# Patient Record
Sex: MALE | Race: BLACK OR AFRICAN AMERICAN | Employment: OTHER | URBAN - METROPOLITAN AREA
[De-identification: names, ages, dates, MRNs, and addresses within clinical notes are randomized per-mention and may not be internally consistent; named-entity substitution may affect disease eponyms.]

---

## 2020-05-28 ENCOUNTER — APPOINTMENT (OUTPATIENT)
Dept: GENERAL RADIOLOGY | Facility: HOSPITAL | Age: 35
DRG: 493 | End: 2020-05-28
Attending: EMERGENCY MEDICINE
Payer: MEDICARE

## 2020-05-28 ENCOUNTER — HOSPITAL ENCOUNTER (INPATIENT)
Facility: HOSPITAL | Age: 35
LOS: 7 days | Discharge: HOME HEALTH | DRG: 493 | End: 2020-06-06
Attending: EMERGENCY MEDICINE | Admitting: INTERNAL MEDICINE
Payer: MEDICARE

## 2020-05-28 DIAGNOSIS — S82.891A CLOSED FRACTURE OF RIGHT ANKLE, INITIAL ENCOUNTER: Primary | ICD-10-CM

## 2020-05-28 PROCEDURE — 73610 X-RAY EXAM OF ANKLE: CPT | Performed by: EMERGENCY MEDICINE

## 2020-05-28 PROCEDURE — 99285 EMERGENCY DEPT VISIT HI MDM: CPT

## 2020-05-28 PROCEDURE — 27840 TREAT ANKLE DISLOCATION: CPT

## 2020-05-28 PROCEDURE — 36415 COLL VENOUS BLD VENIPUNCTURE: CPT

## 2020-05-28 RX ORDER — GLIPIZIDE 10 MG/1
10 TABLET ORAL
COMMUNITY

## 2020-05-28 RX ORDER — ISOSORBIDE DINITRATE 20 MG/1
20 TABLET ORAL 2 TIMES DAILY
Status: ON HOLD | COMMUNITY
End: 2020-05-29

## 2020-05-28 RX ORDER — HYDROCODONE BITARTRATE AND ACETAMINOPHEN 5; 325 MG/1; MG/1
2 TABLET ORAL ONCE
Status: COMPLETED | OUTPATIENT
Start: 2020-05-28 | End: 2020-05-28

## 2020-05-29 ENCOUNTER — APPOINTMENT (OUTPATIENT)
Dept: GENERAL RADIOLOGY | Facility: HOSPITAL | Age: 35
DRG: 493 | End: 2020-05-29
Attending: EMERGENCY MEDICINE
Payer: MEDICARE

## 2020-05-29 PROBLEM — S82.891A CLOSED RIGHT ANKLE FRACTURE, INITIAL ENCOUNTER: Status: ACTIVE | Noted: 2020-05-29

## 2020-05-29 PROBLEM — S82.891A CLOSED FRACTURE OF RIGHT ANKLE, INITIAL ENCOUNTER: Status: ACTIVE | Noted: 2020-05-29

## 2020-05-29 PROBLEM — S82.892A ANKLE FRACTURE, LEFT: Status: ACTIVE | Noted: 2020-05-29

## 2020-05-29 PROCEDURE — 85610 PROTHROMBIN TIME: CPT | Performed by: EMERGENCY MEDICINE

## 2020-05-29 PROCEDURE — 97530 THERAPEUTIC ACTIVITIES: CPT

## 2020-05-29 PROCEDURE — 83036 HEMOGLOBIN GLYCOSYLATED A1C: CPT | Performed by: INTERNAL MEDICINE

## 2020-05-29 PROCEDURE — 84132 ASSAY OF SERUM POTASSIUM: CPT | Performed by: INTERNAL MEDICINE

## 2020-05-29 PROCEDURE — 94660 CPAP INITIATION&MGMT: CPT

## 2020-05-29 PROCEDURE — 73610 X-RAY EXAM OF ANKLE: CPT | Performed by: EMERGENCY MEDICINE

## 2020-05-29 PROCEDURE — 85025 COMPLETE CBC W/AUTO DIFF WBC: CPT | Performed by: EMERGENCY MEDICINE

## 2020-05-29 PROCEDURE — 80048 BASIC METABOLIC PNL TOTAL CA: CPT | Performed by: EMERGENCY MEDICINE

## 2020-05-29 PROCEDURE — 83735 ASSAY OF MAGNESIUM: CPT | Performed by: INTERNAL MEDICINE

## 2020-05-29 PROCEDURE — 97166 OT EVAL MOD COMPLEX 45 MIN: CPT

## 2020-05-29 PROCEDURE — 85025 COMPLETE CBC W/AUTO DIFF WBC: CPT | Performed by: INTERNAL MEDICINE

## 2020-05-29 PROCEDURE — 97162 PT EVAL MOD COMPLEX 30 MIN: CPT

## 2020-05-29 PROCEDURE — 87641 MR-STAPH DNA AMP PROBE: CPT | Performed by: INTERNAL MEDICINE

## 2020-05-29 PROCEDURE — 85730 THROMBOPLASTIN TIME PARTIAL: CPT | Performed by: EMERGENCY MEDICINE

## 2020-05-29 PROCEDURE — 80048 BASIC METABOLIC PNL TOTAL CA: CPT | Performed by: INTERNAL MEDICINE

## 2020-05-29 PROCEDURE — 82962 GLUCOSE BLOOD TEST: CPT

## 2020-05-29 RX ORDER — AMLODIPINE BESYLATE 10 MG/1
10 TABLET ORAL DAILY
Status: DISCONTINUED | OUTPATIENT
Start: 2020-05-29 | End: 2020-06-06

## 2020-05-29 RX ORDER — HYDROCODONE BITARTRATE AND ACETAMINOPHEN 10; 325 MG/1; MG/1
1 TABLET ORAL EVERY 4 HOURS PRN
Status: DISCONTINUED | OUTPATIENT
Start: 2020-05-29 | End: 2020-05-29

## 2020-05-29 RX ORDER — DULOXETIN HYDROCHLORIDE 60 MG/1
60 CAPSULE, DELAYED RELEASE ORAL 2 TIMES DAILY
COMMUNITY

## 2020-05-29 RX ORDER — AMITRIPTYLINE HYDROCHLORIDE 25 MG/1
25 TABLET, FILM COATED ORAL NIGHTLY
Status: DISCONTINUED | OUTPATIENT
Start: 2020-05-29 | End: 2020-06-06

## 2020-05-29 RX ORDER — FOLIC ACID 1 MG/1
TABLET ORAL DAILY
COMMUNITY

## 2020-05-29 RX ORDER — HYDROCODONE BITARTRATE AND ACETAMINOPHEN 5; 325 MG/1; MG/1
1 TABLET ORAL EVERY 6 HOURS PRN
Status: DISCONTINUED | OUTPATIENT
Start: 2020-05-29 | End: 2020-06-03 | Stop reason: ALTCHOICE

## 2020-05-29 RX ORDER — DEXTROSE MONOHYDRATE 25 G/50ML
50 INJECTION, SOLUTION INTRAVENOUS
Status: DISCONTINUED | OUTPATIENT
Start: 2020-05-29 | End: 2020-06-06

## 2020-05-29 RX ORDER — ISOSORBIDE MONONITRATE 60 MG/1
60 TABLET, EXTENDED RELEASE ORAL DAILY
Status: DISCONTINUED | OUTPATIENT
Start: 2020-05-29 | End: 2020-06-02

## 2020-05-29 RX ORDER — MELATONIN
100 DAILY
Status: DISCONTINUED | OUTPATIENT
Start: 2020-05-29 | End: 2020-06-06

## 2020-05-29 RX ORDER — ATORVASTATIN CALCIUM 80 MG/1
80 TABLET, FILM COATED ORAL DAILY
COMMUNITY

## 2020-05-29 RX ORDER — POTASSIUM CHLORIDE 20 MEQ/1
40 TABLET, EXTENDED RELEASE ORAL EVERY 4 HOURS
Status: COMPLETED | OUTPATIENT
Start: 2020-05-29 | End: 2020-05-29

## 2020-05-29 RX ORDER — AMITRIPTYLINE HYDROCHLORIDE 25 MG/1
25 TABLET, FILM COATED ORAL NIGHTLY
COMMUNITY

## 2020-05-29 RX ORDER — CLOPIDOGREL BISULFATE 75 MG/1
75 TABLET ORAL DAILY
Status: ON HOLD | COMMUNITY
End: 2020-05-30

## 2020-05-29 RX ORDER — METOPROLOL TARTRATE 100 MG/1
100 TABLET ORAL 2 TIMES DAILY
Status: DISCONTINUED | OUTPATIENT
Start: 2020-05-29 | End: 2020-06-06

## 2020-05-29 RX ORDER — ISOSORBIDE MONONITRATE 60 MG/1
60 TABLET, EXTENDED RELEASE ORAL DAILY
COMMUNITY
End: 2020-11-11 | Stop reason: DRUGHIGH

## 2020-05-29 RX ORDER — MORPHINE SULFATE 2 MG/ML
2 INJECTION, SOLUTION INTRAMUSCULAR; INTRAVENOUS EVERY 2 HOUR PRN
Status: DISCONTINUED | OUTPATIENT
Start: 2020-05-29 | End: 2020-06-03 | Stop reason: ALTCHOICE

## 2020-05-29 RX ORDER — METOPROLOL TARTRATE 100 MG/1
100 TABLET ORAL 2 TIMES DAILY
COMMUNITY

## 2020-05-29 RX ORDER — LOSARTAN POTASSIUM AND HYDROCHLOROTHIAZIDE 25; 100 MG/1; MG/1
1 TABLET ORAL DAILY
COMMUNITY

## 2020-05-29 RX ORDER — AMLODIPINE BESYLATE 10 MG/1
10 TABLET ORAL DAILY
COMMUNITY

## 2020-05-29 RX ORDER — MORPHINE SULFATE 2 MG/ML
1 INJECTION, SOLUTION INTRAMUSCULAR; INTRAVENOUS EVERY 2 HOUR PRN
Status: DISCONTINUED | OUTPATIENT
Start: 2020-05-29 | End: 2020-06-03 | Stop reason: ALTCHOICE

## 2020-05-29 RX ORDER — ACETAMINOPHEN 325 MG/1
650 TABLET ORAL EVERY 6 HOURS PRN
Status: DISCONTINUED | OUTPATIENT
Start: 2020-05-29 | End: 2020-06-06

## 2020-05-29 RX ORDER — ATORVASTATIN CALCIUM 40 MG/1
80 TABLET, FILM COATED ORAL DAILY
Status: DISCONTINUED | OUTPATIENT
Start: 2020-05-29 | End: 2020-06-06

## 2020-05-29 RX ORDER — MULTIVITAMIN
1 TABLET ORAL DAILY
COMMUNITY

## 2020-05-29 RX ORDER — DULOXETIN HYDROCHLORIDE 60 MG/1
60 CAPSULE, DELAYED RELEASE ORAL 2 TIMES DAILY
Status: DISCONTINUED | OUTPATIENT
Start: 2020-05-29 | End: 2020-06-06

## 2020-05-29 RX ORDER — FOLIC ACID 1 MG/1
1 TABLET ORAL DAILY
Status: DISCONTINUED | OUTPATIENT
Start: 2020-05-29 | End: 2020-06-06

## 2020-05-29 RX ORDER — DOXEPIN HYDROCHLORIDE 50 MG/1
1 CAPSULE ORAL DAILY
Status: DISCONTINUED | OUTPATIENT
Start: 2020-05-29 | End: 2020-06-06

## 2020-05-29 RX ORDER — THIAMINE MONONITRATE (VIT B1) 100 MG
100 TABLET ORAL DAILY
COMMUNITY

## 2020-05-29 RX ORDER — SODIUM CHLORIDE 0.9 % (FLUSH) 0.9 %
3 SYRINGE (ML) INJECTION AS NEEDED
Status: DISCONTINUED | OUTPATIENT
Start: 2020-05-29 | End: 2020-06-06

## 2020-05-29 RX ORDER — MORPHINE SULFATE 4 MG/ML
4 INJECTION, SOLUTION INTRAMUSCULAR; INTRAVENOUS EVERY 2 HOUR PRN
Status: DISCONTINUED | OUTPATIENT
Start: 2020-05-29 | End: 2020-06-03 | Stop reason: ALTCHOICE

## 2020-05-29 RX ORDER — HYDROCODONE BITARTRATE AND ACETAMINOPHEN 5; 325 MG/1; MG/1
2 TABLET ORAL EVERY 6 HOURS PRN
Status: DISCONTINUED | OUTPATIENT
Start: 2020-05-29 | End: 2020-06-03 | Stop reason: ALTCHOICE

## 2020-05-29 NOTE — H&P
[address] Patient Status:  Inpatient    10/30/1985 MRN [id number]   Location [facility] 4W/SW/SE Attending Nivia Hargrove DO   Hosp Day # 0 PCP No primary care provider on file.      D mouth daily. folic acid 1 MG Oral Tab, Take by mouth daily. Multiple Vitamin (ONE-DAILY MULTI VITAMINS) Oral Tab, Take 1 tablet by mouth daily. Vitamin B-1 100 MG Oral Tab, Take 100 mg by mouth daily.   metFORMIN HCl 1000 MG Oral Tab, Take 1,000 mg by mo 05/29/2020    MG 1.9 05/29/2020       Xr Ankle (min 3 Views), Right (cpt=73610)    Result Date: 5/29/2020  CONCLUSION:  1. There has been interval reduction of the previously demonstrated lateral ankle dislocation, now with anatomic alignment.   2. Minimal [address] 61, DO  5/29/2020

## 2020-05-29 NOTE — ED PROVIDER NOTES
Patient Seen in: [facility] AND [facility] Emergency Department      History   Patient presents with:   Ankle Injury    Stated Complaint: Ankle injury    HPI    76-year-old male with history of diabetes, hypertension, stroke with residual left-sided weakness pres Pulmonary effort is normal. No respiratory distress. Musculoskeletal: Normal range of motion. General: Deformity present. Comments: Deformity, swelling, tenderness to right ankle   Skin:     General: Skin is warm.       Capillary Refill: Capi interpretation of the above emergency department workup, the patient was found to have Closed fracture of right ankle, initial encounter  (primary encounter diagnosis)    Plan: Patient with fracture, improved anatomical alignment after reduction.   Due to r

## 2020-05-29 NOTE — ED INITIAL ASSESSMENT (HPI)
Patient presents to ED with c/o of right ankle pain after \"his leg gave out\" and patient stated he rolled his ankle and \"heard a pop\". Denies dizziness, denies head injury, denies loc.

## 2020-05-29 NOTE — CM/SW NOTE
ELVIS spoke with RN during morning report. RN states pt would not anticipate DC for a couple of days due to being on plavix. ELVIS received message from April OT who states the recommendation is for Home with [place] [id number] vs Acute Rehab.  However therapy plans on rowdy

## 2020-05-29 NOTE — CM/SW NOTE
COND 44: Patient failed Inpatient criteria. Second level of review completed and supports Observation. UR committee in agreement. Discussed with Dr Patrick Saul approves.

## 2020-05-29 NOTE — PLAN OF CARE
Pat Farooq is from home with spouse. S/p fall with a right ankle fx. Hx cva and has been on plavix. Surgery in 5-7 days due to this medication. Short leg post mold with ace wrap-cms intact.  1800 ada diet-ac/hs glucose monitoring/insulin as indicated, voiding for bleeding, hypotension and signs of decreased cardiac output  - Evaluate effectiveness of vasoactive medications to optimize hemodynamic stability  - Monitor arterial and/or venous puncture sites for bleeding and/or hematoma  - Assess quality of pulses, bladder  - Monitor intake/output and perform bladder scan as needed  - Follow urinary retention protocol/standard of care  - Consider collaborating with pharmacy to review patient's medication profile  - Implement strategies to promote bladder emptying  Hernando Dodge

## 2020-05-29 NOTE — OCCUPATIONAL THERAPY NOTE
OCCUPATIONAL THERAPY EVALUATION - INPATIENT      Room Number: 390/367-D  Evaluation Date: 5/29/2020  Type of Evaluation: Initial  Presenting Problem: (right ankle fracture)    Physician Order: IP Consult to Occupational Therapy  Reason for Therapy: AD[facility]. He may benefit from Acute Rehab stay if he is unable to safely d/c home. The patient's Approx Degree of Impairment: 50.11% has been calculated based on documentation in the [facility] '6 clicks' Inpatient Daily Activity Short Form.   Research supports COGNITION  Overall Cognitive Status:  WFL - within functional limits    RANGE OF MOTION   Upper extremity ROM is within functional limits     STRENGTH ASSESSMENT  Upper extremity strength is within functional limits         [facility]ILY LIVING [facility] [id number] [address]  [id number]

## 2020-05-29 NOTE — PLAN OF CARE
PT ADMITTED TODAY FOR RIGHT ANKLE FRACTURE AT HOME AFTER FALLING, ALERT AND ORIENTED HE IS NPO AFTER MIDNIGHT FOR POSSIBLE SURGERY AFTER ORTHO (CHARY) AND BAUBLY COME TO EVALUATE IN AM. BED REST MAINTAINED. SCDSX2.  TAKES PLAVIX  [address] SURGICAL CONSULT, PT/OT  - See additional Care Plan goals for specific interventions  5/29/2020 0541 by Anjum Ramírez, RN  Outcome: Progressing  5/29/2020 0452 by Anjum Ramírez RN  Outcome: Progressing  Goal: Patient/Family Short Term Goal  Descript PO as ordered and tolerated  - Nasogastric tube to low intermittent suction as ordered  - Evaluate effectiveness of ordered antiemetic medications  - Provide nonpharmacologic comfort measures as appropriate  - Advance diet as tolerated, if ordered  - Obtai

## 2020-05-29 NOTE — PHYSICAL THERAPY NOTE
PHYSICAL THERAPY EVALUATION - INPATIENT     Room Number: 789/340-F  Evaluation Date: 5/29/2020  Type of Evaluation: Initial   Physician Order: PT Eval and Treat(JACQUES TAYLOR)    Presenting Problem: fall on stairs, R ankle fx  Reason for Therapy: Mobility Dysfu ankle fx immobilized with NWB status, decreased activity tolerance, balance deficits, which are below the patient's pre-admission status.  The patient's Approx Degree of Impairment: 61.29% has been calculated based on documentation in the [facility] '6 clicks' I step and fell on my ankle. \"    PHYSICAL THERAPY EXAMINATION     OBJECTIVE  Precautions: Limb alert - right;Other (Comment)(hx CVA with L estela-paresis)  Fall Risk: High fall risk    WEIGHT BEARING RESTRICTION  Weight Bearing Restriction: R lower extremity Assistance: Not tested  Comment : assist for walker management d/t instability    Bed Mobility: supine to sit tx at SBA    Transfers: sit to stand tx with bariatric rolling walker at MOD A, verbal cues for hand placement and RLE positioning for NWB    Exer

## 2020-05-29 NOTE — PROGRESS NOTES
Patient seen and examined. Chart x-rays reviewed.   Full consult dictated:    54-year-old male with a history of diabetes, hypertension, status post CVA with residual left-sided hemiparesis/hemiplegia on Plavix who missed stepped and sustained a bimalleola

## 2020-05-29 NOTE — CONSULTS
[facility]    PATIENT'S NAME: Pedro Pablo Holley   ATTENDING PHYSICIAN: Bridget Morales DO   CONSULTING PHYSICIAN: Yousif Torres MD   PATIENT ACCOUNT#:   [de-identified]    LOCATION:  [address] [place] [address] #:   [id number]       DATE OF problems because of anesthesia.     MEDICATIONS:  He is on Plavix; duloxetine; losartan; atorvastatin; insulin glargine; isosorbide mononitrate ER; metoprolol; amitriptyline HCL; amlodipine besylate; folic acid; multivitamins; vitamin B1; metformin; glipizi posttraumatic arthritic symptoms of residual pain, stiffness, soreness, and swelling, even with good position and alignment; significant increased risk for skin blister formation and the subsequent need of external fixation or plastic surgical consultation

## 2020-05-30 PROCEDURE — 97530 THERAPEUTIC ACTIVITIES: CPT

## 2020-05-30 PROCEDURE — 82962 GLUCOSE BLOOD TEST: CPT

## 2020-05-30 RX ORDER — HYDROCODONE BITARTRATE AND ACETAMINOPHEN 5; 325 MG/1; MG/1
2 TABLET ORAL EVERY 6 HOURS PRN
Qty: 30 TABLET | Refills: 0 | Status: SHIPPED | OUTPATIENT
Start: 2020-05-30 | End: 2020-06-06

## 2020-05-30 NOTE — PHYSICAL THERAPY NOTE
PHYSICAL THERAPY TREATMENT NOTE - INPATIENT     Room Number: 825/132-S       Presenting Problem: fall on stairs, R ankle fx    Problem List  Principal Problem:    Closed fracture of right ankle, initial encounter  Active Problems:    Closed right ankle fra promotion; Body mechanics; Relaxation;Repositioning    BALANCE                                                                                                                     Static Sitting: Good  Dynamic Sitting: Fair +           Static Standing: Poor + x2   Goal #3 Patient is able to ambulate 10 feet with assist device:bariatric walker - rolling at assistance level: supervision   Goal #3   Current Status Min A x2 with the RW SPT   Goal #4 Patient will negotiate 5 stairs/one curb w/ assistive device and M

## 2020-05-30 NOTE — PROGRESS NOTES
[place] [facility]    Progress Note    Vaenssa Schwartz Patient Status:  Observation    10/30/1985 MRN [id number]   Location [facility] 4W/SW/SE Attending Rosita Fung, [address] [facility]note 2[facility] Day # 1 PCP No primary care provider on file.      Date 2% nasal ointment OINT 1 Application, 1 Application, Each Nare, BID  apixaban (ELIQUIS) tab 2.5 mg, 2.5 mg, Oral, BID  [COMPLETED] Potassium Chloride ER (K-DUR M20) CR tab 40 mEq, 40 mEq, Oral, Q4H  Amitriptyline HCl (ELAVIL) tab 25 mg, 25 mg, Oral, Nightl 97%   BMI 48.80 kg/m²   Physical examination is unchanged. Presently no increased swelling right ankle/foot.   Laboratory Data:  Lab Results   Component Value Date    WBC 7.7 05/29/2020    HGB 13.3 05/29/2020    HCT 39.0 05/29/2020    .0 05/29/2020 performed in the emergency room with considerable improvement in the alignment of the fibular fracture as well.     As this represents an unstable injury with not only a fracture but dislocation and soft tissue injury in a very large, young active individu ordered for DVT prophylaxis.       Again numerous questions were asked and answered to the best of my abilities; therefore, I do feel that the patient has a good understanding of the above discussion.               Digital transcription software was utilize

## 2020-05-30 NOTE — PROGRESS NOTES
[address] Patient Status:  Inpatient    10/30/1985 MRN [id number]   Location [facility] 4W/SW/SE Attending Juan Carlos Mccormack, 160[address] [facility] Day # 1 PCP No primary care provider on file. patient has notable tremor and ataxia  Musculoskeletal: Right ankle is splinted, and patient has normal movement of toes on the right  Integument: Right ankle is splinted and bandaged  Psychiatric: Appropriate mood and affect.         Results:     Lab Resul ankle, initial encounter  -Reduced in ED, however patient will require ORIF  -Surgery planned for 6/3, giving time for Plavix clearance  -Anticoagulate with Eliquis in the meantime  -Appreciate orthopedic surgery recommendations  -PT/OT recommend subacute

## 2020-05-30 NOTE — PLAN OF CARE
Problem: Patient Centered Care  Goal: Patient preferences are identified and integrated in the patient's plan of care  Description  Interventions:  - What would you like us to know as we care for you?  Supportive family  - Provide timely, complete, and ac bleeding and/or hematoma  - Assess quality of pulses, skin color and temperature  - Assess for signs of decreased coronary artery perfusion - ex.  Angina  - Evaluate fluid balance, assess for edema, trend weights  Outcome: Progressing  Goal: Absence of card Functional Mobility  Goal: Achieve highest/safest level of mobility/gait  Description  Interventions:  - Assess patient's functional ability and stability  - Promote increasing activity/tolerance for mobility and gait  - Educate and engage patient/family i

## 2020-05-30 NOTE — OCCUPATIONAL THERAPY NOTE
OCCUPATIONAL THERAPY TREATMENT NOTE - INPATIENT    Room Number: 477/123-L         Presenting Problem: (right ankle fracture)     Problem List  Principal Problem:    Closed fracture of right ankle, initial encounter  Active Problems:    Closed right ankle f Inpatient Daily Activity Short Form  How much help from another person does the patient currently need…  -   Putting on and taking off regular lower body clothing?: A Lot  -   Bathing (including washing, rinsing, drying)?: A Lot  -   Toileting, which inclu

## 2020-05-31 PROBLEM — S82.841A ANKLE FRACTURE, BIMALLEOLAR, CLOSED, RIGHT, INITIAL ENCOUNTER: Status: ACTIVE | Noted: 2020-05-31

## 2020-05-31 PROCEDURE — 82962 GLUCOSE BLOOD TEST: CPT

## 2020-05-31 PROCEDURE — 97530 THERAPEUTIC ACTIVITIES: CPT

## 2020-05-31 RX ORDER — BISACODYL 10 MG
10 SUPPOSITORY, RECTAL RECTAL
Status: DISCONTINUED | OUTPATIENT
Start: 2020-05-31 | End: 2020-06-03 | Stop reason: ALTCHOICE

## 2020-05-31 RX ORDER — SODIUM PHOSPHATE, DIBASIC AND SODIUM PHOSPHATE, MONOBASIC 7; 19 G/133ML; G/133ML
1 ENEMA RECTAL ONCE AS NEEDED
Status: DISCONTINUED | OUTPATIENT
Start: 2020-05-31 | End: 2020-06-03

## 2020-05-31 RX ORDER — POLYETHYLENE GLYCOL 3350 17 G/17G
17 POWDER, FOR SOLUTION ORAL DAILY PRN
Status: DISCONTINUED | OUTPATIENT
Start: 2020-05-31 | End: 2020-06-03

## 2020-05-31 RX ORDER — HYDRALAZINE HYDROCHLORIDE 25 MG/1
25 TABLET, FILM COATED ORAL EVERY 8 HOURS SCHEDULED
Status: DISCONTINUED | OUTPATIENT
Start: 2020-05-31 | End: 2020-06-01

## 2020-05-31 RX ORDER — DOCUSATE SODIUM 100 MG/1
100 CAPSULE, LIQUID FILLED ORAL 2 TIMES DAILY
Status: DISCONTINUED | OUTPATIENT
Start: 2020-05-31 | End: 2020-06-03

## 2020-05-31 RX ORDER — INSULIN ASPART 100 [IU]/ML
3 INJECTION, SOLUTION INTRAVENOUS; SUBCUTANEOUS
Status: DISCONTINUED | OUTPATIENT
Start: 2020-05-31 | End: 2020-05-31

## 2020-05-31 NOTE — PROGRESS NOTES
[address] Patient Status:  Inpatient    10/30/1985 MRN [id number]   Location [facility] 4W/SW/SE Attending Angelina Stockton, [address] [facility] Day # 1 PCP No primary care provider on file. bandaged  Psychiatric: Appropriate mood and affect.         Results:     Lab Results   Component Value Date    WBC 7.7 05/29/2020    HGB 13.3 05/29/2020    HCT 39.0 05/29/2020    .0 05/29/2020    CREATSERUM 1.06 05/29/2020    BUN 15 05/29/2020    NA

## 2020-05-31 NOTE — PROGRESS NOTES
[place] [facility] - [facility]    Progress Note    Paul Ramsay Patient Status:  Observation    10/30/1985 MRN [id number]   Location [facility] 4W/SW/SE Attending Nory Frausto, [address] [facility] Day # 1 PCP No primary care provider on file.      Date flexpen 10 Units, 10 Units, Subcutaneous, Once  Normal Saline Flush 0.9 % injection 3 mL, 3 mL, Intravenous, PRN  glucose (DEX4) oral liquid 15 g, 15 g, Oral, Q15 Min PRN    Or  Glucose-Vitamin C (DEX-4) chewable tab 4 tablet, 4 tablet, Oral, Q15 Min PRN • Stroke [facility])       Surgical History:  History reviewed. No pertinent surgical history. Social History:  Social History    Tobacco Use      Smoking status: Never Smoker      Smokeless tobacco: Never Used    Alcohol use: Yes      Alcohol/week: 5.0 - 6. strict sterile techniques and prophylactic antibiotics; increased risk for malunion, nonunion, delayed union given the significant extent of the injury; the increased risk of hardware-related problems given the comminuted fracture; posttraumatic arthritic

## 2020-05-31 NOTE — PROGRESS NOTES
Took over care at 1500. Pt OOB to the chair. Denies pain. Post mold and ace wrap to RLE. CMS intact. Offered ice pack and discussed importance of RLE elevation to prevent swelling. Pt verbalized understanding of safety measures. Call light within reach.  Wi

## 2020-05-31 NOTE — PLAN OF CARE
Problem: Patient Centered Care  Goal: Patient preferences are identified and integrated in the patient's plan of care  Description  Interventions:  - What would you like us to know as we care for you?  Supportive family  - Provide timely, complete, and ac decreased coronary artery perfusion - ex.  Angina  - Evaluate fluid balance, assess for edema, trend weights  Outcome: Progressing  Goal: Absence of cardiac arrhythmias or at baseline  Description  INTERVENTIONS:  - Continuous cardiac monitoring, monitor vi Assess patient's functional ability and stability  - Promote increasing activity/tolerance for mobility and gait  - Educate and engage patient/family in tolerated activity level and precautions    Outcome: Progressing   RLE post mold c/d/I. CMS intact.  Abl

## 2020-05-31 NOTE — PHYSICAL THERAPY NOTE
PHYSICAL THERAPY TREATMENT NOTE - INPATIENT     Room Number: 395/325-B       Presenting Problem: fall on stairs, R ankle fx    Problem List  Principal Problem:    Closed fracture of right ankle, initial encounter  Active Problems:    Closed right ankle fra 0  Location: R ankle  Management Techniques: Activity promotion; Body mechanics; Relaxation;Repositioning    BALANCE                                                                                                                     Static Sitting: Good  Dyn supervision with none     Goal #2  Current Status Mod A    Goal #3 Patient is able to ambulate 10 feet with assist device:bariatric walker - rolling at assistance level: supervision   Goal #3   Current Status Mod>max A with the RW while hopping a few steps

## 2020-05-31 NOTE — CONSULTS
Consult requested on this 27year old man with PMH of HTN, morbid obesity with BMI 48, diabeted, and history of CVA with left sided hemiparesis, tremor and ataxia.  Presented to ED on 5/28 for right ankle pain after mechanical fall, diagnosed with right bim

## 2020-06-01 PROCEDURE — 97110 THERAPEUTIC EXERCISES: CPT

## 2020-06-01 PROCEDURE — 82962 GLUCOSE BLOOD TEST: CPT

## 2020-06-01 RX ORDER — HYDRALAZINE HYDROCHLORIDE 100 MG/1
100 TABLET, FILM COATED ORAL EVERY 8 HOURS SCHEDULED
Status: DISCONTINUED | OUTPATIENT
Start: 2020-06-01 | End: 2020-06-06

## 2020-06-01 NOTE — PROGRESS NOTES
[place] [facility] - [facility]    Progress note    Mitzi Mccann Patient Status:  Inpatient    10/30/1985 MRN [id number]   Location [facility] 4W/SW/SE Attending Tanvi Mancini, [address] [facility] Day # 1 PCP No primary care provider on file.        Subj and bandaged  Psychiatric: Appropriate mood and affect.         Results:     Lab Results   Component Value Date    WBC 7.7 05/29/2020    HGB 13.3 05/29/2020    HCT 39.0 05/29/2020    .0 05/29/2020    CREATSERUM 1.06 05/29/2020    BUN 15 05/29/2020

## 2020-06-01 NOTE — PLAN OF CARE
Problem: Diabetes/Glucose Control  Goal: Glucose maintained within prescribed range  Description  INTERVENTIONS:  - Monitor Blood Glucose as ordered  - Assess for signs and symptoms of hyperglycemia and hypoglycemia  - Administer ordered medications to m Evaluate fluid balance  Outcome: Progressing     Problem: GENITOURINARY - ADULT  Goal: Absence of urinary retention  Description  INTERVENTIONS:  - Assess patient’s ability to void and empty bladder  - Monitor intake/output and perform bladder scan as need

## 2020-06-01 NOTE — PROGRESS NOTES
[place] [facility] - [facility]    Progress Note    Henny Madden Patient Status:  Observation    10/30/1985 MRN [id number]   Location [facility] 4W/SW/SE Attending Jozef Caal, [address] [facility] Day # 1 PCP No primary care provider on file.      Date Aspart Pen (NOVOLOG) 100 UNIT/ML flexpen 1-11 Units, 1-11 Units, Subcutaneous, TID CC and HS  [COMPLETED] Insulin Aspart Pen (NOVOLOG) 100 UNIT/ML flexpen 10 Units, 10 Units, Subcutaneous, Once  Normal Saline Flush 0.9 % injection 3 mL, 3 mL, Intravenous, tablet, Oral, Once        HISTORY:  Past Medical History:  Past Medical History:   Diagnosis Date   • Diabetes ([facility] [address]    • Essential hypertension    • Stroke [facility])       Surgical History:  History reviewed. No pertinent surgical history.    Social History: strict sterile techniques and prophylactic antibiotics; increased risk for malunion, nonunion, delayed union given the significant extent of the injury; the increased risk of hardware-related problems given the comminuted fracture; posttraumatic arthritic

## 2020-06-01 NOTE — PHYSICAL THERAPY NOTE
PHYSICAL THERAPY TREATMENT NOTE - INPATIENT     Room Number: 443/040-K       Presenting Problem: fall on stairs, R ankle fx    Problem List  Principal Problem:    Closed fracture of right ankle, initial encounter  Active Problems:    Closed right ankle fra Static Sitting: Good  Dynamic Sitting: Fair +           Static Standing: Not tested  Dynamic Standing: Not tested    ACTIVITY TOLERANCE                         O2 WALK                  AM-PAC '6-Clicks' INPATIENT KAREN feet with assist device:bariatric walker - rolling at assistance level: supervision   Goal #3   Current Status NT   Goal #4 Patient will negotiate 5 stairs/one curb w/ assistive device and MIN A   Goal #4   Current Status NT   Goal #5 Patient to demonstrat

## 2020-06-02 ENCOUNTER — APPOINTMENT (OUTPATIENT)
Dept: INTERVENTIONAL RADIOLOGY/VASCULAR | Facility: HOSPITAL | Age: 35
DRG: 493 | End: 2020-06-02
Attending: NURSE PRACTITIONER
Payer: MEDICARE

## 2020-06-02 PROCEDURE — 99152 MOD SED SAME PHYS/QHP 5/>YRS: CPT

## 2020-06-02 PROCEDURE — 99153 MOD SED SAME PHYS/QHP EA: CPT

## 2020-06-02 PROCEDURE — 82962 GLUCOSE BLOOD TEST: CPT

## 2020-06-02 PROCEDURE — 93458 L HRT ARTERY/VENTRICLE ANGIO: CPT

## 2020-06-02 PROCEDURE — 85730 THROMBOPLASTIN TIME PARTIAL: CPT | Performed by: INTERNAL MEDICINE

## 2020-06-02 PROCEDURE — 93005 ELECTROCARDIOGRAM TRACING: CPT

## 2020-06-02 PROCEDURE — 97530 THERAPEUTIC ACTIVITIES: CPT

## 2020-06-02 PROCEDURE — 97110 THERAPEUTIC EXERCISES: CPT

## 2020-06-02 PROCEDURE — 85576 BLOOD PLATELET AGGREGATION: CPT | Performed by: ORTHOPAEDIC SURGERY

## 2020-06-02 PROCEDURE — 93010 ELECTROCARDIOGRAM REPORT: CPT | Performed by: INTERNAL MEDICINE

## 2020-06-02 RX ORDER — ASPIRIN 81 MG/1
324 TABLET, CHEWABLE ORAL ONCE
Status: COMPLETED | OUTPATIENT
Start: 2020-06-02 | End: 2020-06-02

## 2020-06-02 RX ORDER — DIPHENHYDRAMINE HYDROCHLORIDE 50 MG/ML
INJECTION INTRAMUSCULAR; INTRAVENOUS
Status: COMPLETED
Start: 2020-06-02 | End: 2020-06-02

## 2020-06-02 RX ORDER — SODIUM CHLORIDE 9 MG/ML
INJECTION, SOLUTION INTRAVENOUS CONTINUOUS
Status: ACTIVE | OUTPATIENT
Start: 2020-06-02 | End: 2020-06-02

## 2020-06-02 RX ORDER — CHLORHEXIDINE GLUCONATE 4 G/100ML
30 SOLUTION TOPICAL
Status: COMPLETED | OUTPATIENT
Start: 2020-06-03 | End: 2020-06-03

## 2020-06-02 RX ORDER — VERAPAMIL HYDROCHLORIDE 2.5 MG/ML
INJECTION, SOLUTION INTRAVENOUS
Status: COMPLETED
Start: 2020-06-02 | End: 2020-06-02

## 2020-06-02 RX ORDER — LIDOCAINE HYDROCHLORIDE 20 MG/ML
INJECTION, SOLUTION EPIDURAL; INFILTRATION; INTRACAUDAL; PERINEURAL
Status: COMPLETED
Start: 2020-06-02 | End: 2020-06-02

## 2020-06-02 RX ORDER — SODIUM CHLORIDE 9 MG/ML
INJECTION, SOLUTION INTRAVENOUS CONTINUOUS
Status: DISCONTINUED | OUTPATIENT
Start: 2020-06-02 | End: 2020-06-03 | Stop reason: ALTCHOICE

## 2020-06-02 RX ORDER — HEPARIN SODIUM 1000 [USP'U]/ML
INJECTION, SOLUTION INTRAVENOUS; SUBCUTANEOUS
Status: COMPLETED
Start: 2020-06-02 | End: 2020-06-02

## 2020-06-02 RX ORDER — SODIUM CHLORIDE 9 MG/ML
INJECTION, SOLUTION INTRAVENOUS CONTINUOUS
Status: DISCONTINUED | OUTPATIENT
Start: 2020-06-03 | End: 2020-06-04

## 2020-06-02 RX ORDER — ISOSORBIDE MONONITRATE 60 MG/1
120 TABLET, EXTENDED RELEASE ORAL DAILY
Status: DISCONTINUED | OUTPATIENT
Start: 2020-06-02 | End: 2020-06-06

## 2020-06-02 RX ORDER — MIDAZOLAM HYDROCHLORIDE 1 MG/ML
INJECTION INTRAMUSCULAR; INTRAVENOUS
Status: COMPLETED
Start: 2020-06-02 | End: 2020-06-02

## 2020-06-02 NOTE — CONSULTS
[address] [facility]  Cardiology Consultation    Karen Christensen Location: [facility] 4W/SW/SE    10/30/1985 MRN [id number]   Consulting Date 2020 CSN [id number]   Consulting Physicia refills and was fearful of making an appointment due to [place]. Cardiac risks include diabetes, hypertension, hypercholesterolemia, positive family history with multiple uncles, he quit smoking about 10 years ago.   He states he drinks about 3 shots of al UNIT/ML flexpen 8 Units, 8 Units, Subcutaneous, TID CC  •  [START ON 6/3/2020] 0.9% NaCl infusion, , Intravenous, Continuous  •  ceFAZolin in NS (ANCEF) 3g/100mL IVPB premix, 3 g, Intravenous, On Call to OR  •  hydrALAzine HCl (APRESOLINE) tab 100 mg, 100 mg for Hyzaar 100/12.5 ([facility] only), , Oral, Daily  •  Metoprolol Tartrate (LOPRESSOR) tab 100 mg, 100 mg, Oral, BID  •  multivitamin (ADULT) tab 1 tablet, 1 tablet, Oral, Daily  •  Thiamine HCl (Vitamin B-1) tab 100 mg, 100 mg, Oral, Daily  •  HYDROcodone-a complications with his multiple risk factors. He has a history of an abnormal echocardiogram and mildly elevated troponins in the past.  Recommend ischemic prior to surgery.   With multiple risk factors and akinesis on echocardiogram, would recommend proce

## 2020-06-02 NOTE — PHYSICAL THERAPY NOTE
PHYSICAL THERAPY TREATMENT NOTE - INPATIENT     Room Number: 958/923-C       Presenting Problem: fall on stairs, R ankle fx    Problem List  Principal Problem:    Closed fracture of right ankle, initial encounter  Active Problems:    Closed right ankle fra Position: Sitting    O2 WALK                  AM-PAC '6-Clicks' INPATIENT SHORT FORM - BASIC MOBILITY  How much difficulty does the patient currently have. ..  -   Turning over in bed (including adjusting bedclothes, sheets and blankets)?: A Little   -   Si 5-6 small side steps bed to chair with RW and min a   Goal #4 Patient will negotiate 5 stairs/one curb w/ assistive device and MIN A   Goal #4   Current Status NT   Goal #5 Patient to demonstrate independence with home activity/exercise instructions provid

## 2020-06-02 NOTE — PROCEDURES
[facility]  [facility] Cardiac Cath Procedure Note  Aric Michelle Patient Status:  Inpatient    10/30/1985 MRN [id number]   Location [facility] Attending Nikos Adams, [address] [facility] Day # 3 PCP No primary care provid of versed and fentanyl was given. Patient was assessed and monitoring of oxygen, heart rate and blood pressure by nurse and myself during the exam 67 minutes.       Aden Escalante MD  06/02/20

## 2020-06-02 NOTE — PLAN OF CARE
Patient is a/ox4. RA. Post-cath, no intervention. R wrist and R groin sites both c/d/I. Bedrest for 2 hours. Then up with max assist x2 with the walker, stand-pivot transfers. Continent. Accucheck ACHS. IVF @ 100 ml/hr. Plan is for ankle surgery tomorrow. Progressing  Goal: Absence of cardiac arrhythmias or at baseline  Description  INTERVENTIONS:  - Continuous cardiac monitoring, monitor vital signs, obtain 12 lead EKG if indicated  - Evaluate effectiveness of antiarrhythmic and heart rate control medicati Educate and engage patient/family in tolerated activity level and precautions  Outcome: Progressing

## 2020-06-02 NOTE — PLAN OF CARE
Coronary angiogram films reviewed. Angiogram showed tight lesion in the mid LAD which is a bifurcating lesion with a medium size diagonal.  There is diffuse disease in the distal LAD.   There is diffuse disease in the circumflex and proximal segment but no

## 2020-06-02 NOTE — PROGRESS NOTES
[place] [facility] - [facility]    Progress note    Anuptarsha uJve Patient Status:  Inpatient    10/30/1985 MRN [id number]   Location [facility] 4W/SW/SE Attending Juan Vale, [address] [facility] Day # 3 PCP No primary care provider on file.        Subj extremity, however in left upper and lower extremities patient has notable tremor and ataxia  Musculoskeletal: Right ankle is splinted, and patient has normal movement of toes on the right  Integument: Right ankle is splinted and bandaged  Psychiatric: Alma Rosa

## 2020-06-02 NOTE — PROGRESS NOTES
Procedure hand off report given to Ronna Viera RN. Procedural access site is dry and intact with no signs and symptoms of bleeding and hematoma. Groin check done with Ronna Viera RN, upon transfer to floor. Pt is generally stable.

## 2020-06-03 ENCOUNTER — APPOINTMENT (OUTPATIENT)
Dept: GENERAL RADIOLOGY | Facility: HOSPITAL | Age: 35
DRG: 493 | End: 2020-06-03
Attending: ORTHOPAEDIC SURGERY
Payer: MEDICARE

## 2020-06-03 ENCOUNTER — ANESTHESIA EVENT (OUTPATIENT)
Dept: SURGERY | Facility: HOSPITAL | Age: 35
DRG: 493 | End: 2020-06-03
Payer: MEDICARE

## 2020-06-03 ENCOUNTER — ANESTHESIA (OUTPATIENT)
Dept: SURGERY | Facility: HOSPITAL | Age: 35
DRG: 493 | End: 2020-06-03
Payer: MEDICARE

## 2020-06-03 PROCEDURE — 85610 PROTHROMBIN TIME: CPT | Performed by: INTERNAL MEDICINE

## 2020-06-03 PROCEDURE — 82962 GLUCOSE BLOOD TEST: CPT

## 2020-06-03 PROCEDURE — 85025 COMPLETE CBC W/AUTO DIFF WBC: CPT | Performed by: INTERNAL MEDICINE

## 2020-06-03 PROCEDURE — 4A023N7 MEASUREMENT OF CARDIAC SAMPLING AND PRESSURE, LEFT HEART, PERCUTANEOUS APPROACH: ICD-10-PCS | Performed by: ORTHOPAEDIC SURGERY

## 2020-06-03 PROCEDURE — 76000 FLUOROSCOPY <1 HR PHYS/QHP: CPT | Performed by: ORTHOPAEDIC SURGERY

## 2020-06-03 PROCEDURE — B2111ZZ FLUOROSCOPY OF MULTIPLE CORONARY ARTERIES USING LOW OSMOLAR CONTRAST: ICD-10-PCS | Performed by: ORTHOPAEDIC SURGERY

## 2020-06-03 PROCEDURE — 0QSJ04Z REPOSITION RIGHT FIBULA WITH INTERNAL FIXATION DEVICE, OPEN APPROACH: ICD-10-PCS | Performed by: ORTHOPAEDIC SURGERY

## 2020-06-03 PROCEDURE — 85576 BLOOD PLATELET AGGREGATION: CPT | Performed by: ORTHOPAEDIC SURGERY

## 2020-06-03 PROCEDURE — B2151ZZ FLUOROSCOPY OF LEFT HEART USING LOW OSMOLAR CONTRAST: ICD-10-PCS | Performed by: ORTHOPAEDIC SURGERY

## 2020-06-03 PROCEDURE — 80048 BASIC METABOLIC PNL TOTAL CA: CPT | Performed by: INTERNAL MEDICINE

## 2020-06-03 PROCEDURE — 0QSGXZZ REPOSITION RIGHT TIBIA, EXTERNAL APPROACH: ICD-10-PCS | Performed by: ORTHOPAEDIC SURGERY

## 2020-06-03 DEVICE — SCREW BN 2.7MM 2.1MM 16MM LCP: Type: IMPLANTABLE DEVICE | Site: ANKLE | Status: FUNCTIONAL

## 2020-06-03 DEVICE — SCREW BN 2.7MM 22MM DCP SS ST: Type: IMPLANTABLE DEVICE | Site: ANKLE | Status: FUNCTIONAL

## 2020-06-03 DEVICE — SCREW BN 2.7MM 20MM LCP SS: Type: IMPLANTABLE DEVICE | Site: ANKLE | Status: FUNCTIONAL

## 2020-06-03 DEVICE — IMPLANTABLE DEVICE: Type: IMPLANTABLE DEVICE | Site: ANKLE | Status: FUNCTIONAL

## 2020-06-03 DEVICE — SCREW BN 2.7MM 18MM LCP SS: Type: IMPLANTABLE DEVICE | Site: ANKLE | Status: FUNCTIONAL

## 2020-06-03 DEVICE — SCREW LOCKING 3.5X18 212.105: Type: IMPLANTABLE DEVICE | Site: ANKLE | Status: FUNCTIONAL

## 2020-06-03 DEVICE — SCREW BN 2.7MM 2.1MM 12MM LCP: Type: IMPLANTABLE DEVICE | Site: ANKLE | Status: FUNCTIONAL

## 2020-06-03 RX ORDER — DIPHENHYDRAMINE HYDROCHLORIDE 50 MG/ML
50 INJECTION INTRAMUSCULAR; INTRAVENOUS ONCE
Status: COMPLETED | OUTPATIENT
Start: 2020-06-03 | End: 2020-06-03

## 2020-06-03 RX ORDER — OXYCODONE HYDROCHLORIDE 5 MG/1
10 TABLET ORAL EVERY 4 HOURS PRN
Status: DISPENSED | OUTPATIENT
Start: 2020-06-03 | End: 2020-06-05

## 2020-06-03 RX ORDER — DIPHENHYDRAMINE HYDROCHLORIDE 50 MG/ML
25 INJECTION INTRAMUSCULAR; INTRAVENOUS ONCE
Status: COMPLETED | OUTPATIENT
Start: 2020-06-03 | End: 2020-06-03

## 2020-06-03 RX ORDER — DOCUSATE SODIUM 100 MG/1
100 CAPSULE, LIQUID FILLED ORAL 2 TIMES DAILY
Status: DISCONTINUED | OUTPATIENT
Start: 2020-06-03 | End: 2020-06-06

## 2020-06-03 RX ORDER — SODIUM CHLORIDE, SODIUM LACTATE, POTASSIUM CHLORIDE, CALCIUM CHLORIDE 600; 310; 30; 20 MG/100ML; MG/100ML; MG/100ML; MG/100ML
INJECTION, SOLUTION INTRAVENOUS CONTINUOUS
Status: DISCONTINUED | OUTPATIENT
Start: 2020-06-03 | End: 2020-06-03 | Stop reason: HOSPADM

## 2020-06-03 RX ORDER — PREDNISONE 10 MG/1
10 TABLET ORAL ONCE
Status: COMPLETED | OUTPATIENT
Start: 2020-06-03 | End: 2020-06-03

## 2020-06-03 RX ORDER — HYDROMORPHONE HYDROCHLORIDE 1 MG/ML
0.2 INJECTION, SOLUTION INTRAMUSCULAR; INTRAVENOUS; SUBCUTANEOUS EVERY 5 MIN PRN
Status: DISCONTINUED | OUTPATIENT
Start: 2020-06-03 | End: 2020-06-03 | Stop reason: HOSPADM

## 2020-06-03 RX ORDER — NALOXONE HYDROCHLORIDE 0.4 MG/ML
80 INJECTION, SOLUTION INTRAMUSCULAR; INTRAVENOUS; SUBCUTANEOUS AS NEEDED
Status: DISCONTINUED | OUTPATIENT
Start: 2020-06-03 | End: 2020-06-03 | Stop reason: HOSPADM

## 2020-06-03 RX ORDER — ONDANSETRON 2 MG/ML
4 INJECTION INTRAMUSCULAR; INTRAVENOUS EVERY 6 HOURS PRN
Status: DISCONTINUED | OUTPATIENT
Start: 2020-06-03 | End: 2020-06-06

## 2020-06-03 RX ORDER — MIDAZOLAM HYDROCHLORIDE 1 MG/ML
INJECTION INTRAMUSCULAR; INTRAVENOUS AS NEEDED
Status: DISCONTINUED | OUTPATIENT
Start: 2020-06-03 | End: 2020-06-03 | Stop reason: SURG

## 2020-06-03 RX ORDER — FAMOTIDINE 20 MG/1
20 TABLET ORAL 2 TIMES DAILY
Status: DISCONTINUED | OUTPATIENT
Start: 2020-06-03 | End: 2020-06-06

## 2020-06-03 RX ORDER — HALOPERIDOL 5 MG/ML
0.25 INJECTION INTRAMUSCULAR ONCE AS NEEDED
Status: DISCONTINUED | OUTPATIENT
Start: 2020-06-03 | End: 2020-06-03 | Stop reason: HOSPADM

## 2020-06-03 RX ORDER — LIDOCAINE HYDROCHLORIDE 10 MG/ML
INJECTION, SOLUTION INFILTRATION; PERINEURAL
Status: COMPLETED | OUTPATIENT
Start: 2020-06-03 | End: 2020-06-03

## 2020-06-03 RX ORDER — MORPHINE SULFATE 4 MG/ML
4 INJECTION, SOLUTION INTRAMUSCULAR; INTRAVENOUS EVERY 10 MIN PRN
Status: DISCONTINUED | OUTPATIENT
Start: 2020-06-03 | End: 2020-06-03 | Stop reason: HOSPADM

## 2020-06-03 RX ORDER — POLYETHYLENE GLYCOL 3350 17 G/17G
17 POWDER, FOR SOLUTION ORAL DAILY PRN
Status: DISCONTINUED | OUTPATIENT
Start: 2020-06-03 | End: 2020-06-06

## 2020-06-03 RX ORDER — LABETALOL HYDROCHLORIDE 5 MG/ML
INJECTION, SOLUTION INTRAVENOUS AS NEEDED
Status: DISCONTINUED | OUTPATIENT
Start: 2020-06-03 | End: 2020-06-03 | Stop reason: SURG

## 2020-06-03 RX ORDER — METOPROLOL TARTRATE 5 MG/5ML
2.5 INJECTION INTRAVENOUS ONCE
Status: DISCONTINUED | OUTPATIENT
Start: 2020-06-03 | End: 2020-06-03 | Stop reason: HOSPADM

## 2020-06-03 RX ORDER — HYDROMORPHONE HYDROCHLORIDE 1 MG/ML
0.4 INJECTION, SOLUTION INTRAMUSCULAR; INTRAVENOUS; SUBCUTANEOUS EVERY 2 HOUR PRN
Status: DISCONTINUED | OUTPATIENT
Start: 2020-06-03 | End: 2020-06-06

## 2020-06-03 RX ORDER — FAMOTIDINE 10 MG/ML
20 INJECTION, SOLUTION INTRAVENOUS 2 TIMES DAILY
Status: DISCONTINUED | OUTPATIENT
Start: 2020-06-03 | End: 2020-06-06

## 2020-06-03 RX ORDER — HYDROMORPHONE HYDROCHLORIDE 1 MG/ML
0.6 INJECTION, SOLUTION INTRAMUSCULAR; INTRAVENOUS; SUBCUTANEOUS EVERY 5 MIN PRN
Status: DISCONTINUED | OUTPATIENT
Start: 2020-06-03 | End: 2020-06-03 | Stop reason: HOSPADM

## 2020-06-03 RX ORDER — BUPIVACAINE HYDROCHLORIDE 7.5 MG/ML
INJECTION, SOLUTION INTRASPINAL
Status: COMPLETED | OUTPATIENT
Start: 2020-06-03 | End: 2020-06-03

## 2020-06-03 RX ORDER — EPHEDRINE SULFATE 50 MG/ML
INJECTION, SOLUTION INTRAVENOUS AS NEEDED
Status: DISCONTINUED | OUTPATIENT
Start: 2020-06-03 | End: 2020-06-03 | Stop reason: SURG

## 2020-06-03 RX ORDER — MORPHINE SULFATE 4 MG/ML
2 INJECTION, SOLUTION INTRAMUSCULAR; INTRAVENOUS EVERY 10 MIN PRN
Status: DISCONTINUED | OUTPATIENT
Start: 2020-06-03 | End: 2020-06-03 | Stop reason: HOSPADM

## 2020-06-03 RX ORDER — DIPHENHYDRAMINE HYDROCHLORIDE 50 MG/ML
50 INJECTION INTRAMUSCULAR; INTRAVENOUS EVERY 8 HOURS PRN
Status: DISCONTINUED | OUTPATIENT
Start: 2020-06-03 | End: 2020-06-06

## 2020-06-03 RX ORDER — SODIUM PHOSPHATE, DIBASIC AND SODIUM PHOSPHATE, MONOBASIC 7; 19 G/133ML; G/133ML
1 ENEMA RECTAL ONCE AS NEEDED
Status: DISCONTINUED | OUTPATIENT
Start: 2020-06-03 | End: 2020-06-06

## 2020-06-03 RX ORDER — OXYCODONE HYDROCHLORIDE 5 MG/1
5 TABLET ORAL EVERY 4 HOURS PRN
Status: ACTIVE | OUTPATIENT
Start: 2020-06-03 | End: 2020-06-05

## 2020-06-03 RX ORDER — HYDROCODONE BITARTRATE AND ACETAMINOPHEN 5; 325 MG/1; MG/1
1 TABLET ORAL AS NEEDED
Status: DISCONTINUED | OUTPATIENT
Start: 2020-06-03 | End: 2020-06-03 | Stop reason: HOSPADM

## 2020-06-03 RX ORDER — MORPHINE SULFATE 10 MG/ML
6 INJECTION, SOLUTION INTRAMUSCULAR; INTRAVENOUS EVERY 10 MIN PRN
Status: DISCONTINUED | OUTPATIENT
Start: 2020-06-03 | End: 2020-06-03 | Stop reason: HOSPADM

## 2020-06-03 RX ORDER — HYDROMORPHONE HYDROCHLORIDE 1 MG/ML
0.8 INJECTION, SOLUTION INTRAMUSCULAR; INTRAVENOUS; SUBCUTANEOUS EVERY 2 HOUR PRN
Status: DISCONTINUED | OUTPATIENT
Start: 2020-06-03 | End: 2020-06-06

## 2020-06-03 RX ORDER — HYDROMORPHONE HYDROCHLORIDE 1 MG/ML
0.4 INJECTION, SOLUTION INTRAMUSCULAR; INTRAVENOUS; SUBCUTANEOUS EVERY 5 MIN PRN
Status: DISCONTINUED | OUTPATIENT
Start: 2020-06-03 | End: 2020-06-03 | Stop reason: HOSPADM

## 2020-06-03 RX ORDER — POTASSIUM CHLORIDE 20 MEQ/1
40 TABLET, EXTENDED RELEASE ORAL ONCE
Status: COMPLETED | OUTPATIENT
Start: 2020-06-03 | End: 2020-06-03

## 2020-06-03 RX ORDER — ONDANSETRON 2 MG/ML
4 INJECTION INTRAMUSCULAR; INTRAVENOUS ONCE AS NEEDED
Status: DISCONTINUED | OUTPATIENT
Start: 2020-06-03 | End: 2020-06-03 | Stop reason: HOSPADM

## 2020-06-03 RX ORDER — MORPHINE SULFATE 1 MG/ML
INJECTION, SOLUTION EPIDURAL; INTRATHECAL; INTRAVENOUS
Status: COMPLETED | OUTPATIENT
Start: 2020-06-03 | End: 2020-06-03

## 2020-06-03 RX ORDER — CEFAZOLIN SODIUM 1 G/3ML
INJECTION, POWDER, FOR SOLUTION INTRAMUSCULAR; INTRAVENOUS AS NEEDED
Status: DISCONTINUED | OUTPATIENT
Start: 2020-06-03 | End: 2020-06-03

## 2020-06-03 RX ORDER — BISACODYL 10 MG
10 SUPPOSITORY, RECTAL RECTAL
Status: DISCONTINUED | OUTPATIENT
Start: 2020-06-03 | End: 2020-06-06

## 2020-06-03 RX ORDER — OXYCODONE HYDROCHLORIDE 5 MG/1
15 TABLET ORAL EVERY 4 HOURS PRN
Status: ACTIVE | OUTPATIENT
Start: 2020-06-03 | End: 2020-06-05

## 2020-06-03 RX ORDER — PROCHLORPERAZINE EDISYLATE 5 MG/ML
5 INJECTION INTRAMUSCULAR; INTRAVENOUS ONCE AS NEEDED
Status: DISCONTINUED | OUTPATIENT
Start: 2020-06-03 | End: 2020-06-03 | Stop reason: HOSPADM

## 2020-06-03 RX ORDER — HYDROMORPHONE HYDROCHLORIDE 1 MG/ML
0.2 INJECTION, SOLUTION INTRAMUSCULAR; INTRAVENOUS; SUBCUTANEOUS EVERY 2 HOUR PRN
Status: DISCONTINUED | OUTPATIENT
Start: 2020-06-03 | End: 2020-06-06

## 2020-06-03 RX ORDER — DEXTROSE MONOHYDRATE 25 G/50ML
50 INJECTION, SOLUTION INTRAVENOUS
Status: DISCONTINUED | OUTPATIENT
Start: 2020-06-03 | End: 2020-06-03 | Stop reason: HOSPADM

## 2020-06-03 RX ORDER — SODIUM CHLORIDE, SODIUM LACTATE, POTASSIUM CHLORIDE, CALCIUM CHLORIDE 600; 310; 30; 20 MG/100ML; MG/100ML; MG/100ML; MG/100ML
INJECTION, SOLUTION INTRAVENOUS CONTINUOUS PRN
Status: DISCONTINUED | OUTPATIENT
Start: 2020-06-03 | End: 2020-06-03 | Stop reason: SURG

## 2020-06-03 RX ORDER — HYDROCODONE BITARTRATE AND ACETAMINOPHEN 5; 325 MG/1; MG/1
2 TABLET ORAL AS NEEDED
Status: DISCONTINUED | OUTPATIENT
Start: 2020-06-03 | End: 2020-06-03 | Stop reason: HOSPADM

## 2020-06-03 RX ADMIN — MORPHINE SULFATE 0.3 MG: 1 INJECTION, SOLUTION EPIDURAL; INTRATHECAL; INTRAVENOUS at 09:35:00

## 2020-06-03 RX ADMIN — EPHEDRINE SULFATE 10 MG: 50 INJECTION, SOLUTION INTRAVENOUS at 10:02:00

## 2020-06-03 RX ADMIN — BUPIVACAINE HYDROCHLORIDE 1.5 ML: 7.5 INJECTION, SOLUTION INTRASPINAL at 09:35:00

## 2020-06-03 RX ADMIN — SODIUM CHLORIDE, SODIUM LACTATE, POTASSIUM CHLORIDE, CALCIUM CHLORIDE: 600; 310; 30; 20 INJECTION, SOLUTION INTRAVENOUS at 09:11:00

## 2020-06-03 RX ADMIN — LABETALOL HYDROCHLORIDE 10 MG: 5 INJECTION, SOLUTION INTRAVENOUS at 11:25:00

## 2020-06-03 RX ADMIN — SODIUM CHLORIDE, SODIUM LACTATE, POTASSIUM CHLORIDE, CALCIUM CHLORIDE: 600; 310; 30; 20 INJECTION, SOLUTION INTRAVENOUS at 12:12:00

## 2020-06-03 RX ADMIN — LIDOCAINE HYDROCHLORIDE 5 ML: 10 INJECTION, SOLUTION INFILTRATION; PERINEURAL at 09:35:00

## 2020-06-03 RX ADMIN — MIDAZOLAM HYDROCHLORIDE 2 MG: 1 INJECTION INTRAMUSCULAR; INTRAVENOUS at 09:12:00

## 2020-06-03 RX ADMIN — EPHEDRINE SULFATE 5 MG: 50 INJECTION, SOLUTION INTRAVENOUS at 09:50:00

## 2020-06-03 NOTE — ANESTHESIA PROCEDURE NOTES
Spinal Block  Date/Time: 6/3/2020 9:35 AM  Performed by: Patrica Tim MD  Authorized by: Patrica Tim MD       General Information and Staff    Start Time:  6/3/2020 9:25 AM  End Time:  6/3/2020 9:35 AM  Anesthesiologist:  Patrica Tim MD  Reason for Block

## 2020-06-03 NOTE — OPERATIVE REPORT
[facility]    PATIENT'S NAME: Pedro Pablo Ra   ATTENDING PHYSICIAN: Bridget Morales DO   OPERATING PHYSICIAN: Yousif Torres MD   PATIENT ACCOUNT#:   [de-identified]    LOCATION:  [address] [place] [address] #:   [id number]       DATE OF BI gently curetted with exposure of the fracture site. Copious irrigation was utilized.   The fracture was anatomically reduced as corroborated by C-arm fluoroscopic views, and a 3.5 cortical lag screw, the appropriate length, with a washer was applied, drill

## 2020-06-03 NOTE — PROGRESS NOTES
[place] [facility] - [facility]    Progress note    Devota Chanel Patient Status:  Inpatient    10/30/1985 MRN [id number]   Location [facility] 4W/SW/SE Attending Ligia Bush, [address] [facility] Day # 4 PCP No primary care provider on file.        Subj patient has normal movement of toes on the right  Integument: Warm, dry, no rash  Psychiatric: Appropriate mood and affect.         Results:     Lab Results   Component Value Date    WBC 5.6 06/03/2020    HGB 13.1 06/03/2020    HCT 39.4 06/03/2020    PLT 22

## 2020-06-03 NOTE — ANESTHESIA POSTPROCEDURE EVALUATION
Patient: Khris Jones    Procedure Summary     Date:  06/03/20 Room / Location:  [facility] OR  / [facility] OR    Anesthesia Start:  4386 Anesthesia Stop:  4392    Procedure:  ANKLE OPEN REDUCTION INTERNAL FIXATION (Right Ankle) Diagnosis:  (trimallel

## 2020-06-03 NOTE — BRIEF OP NOTE
Vladislav [address]   Brief Op Note    Patients Name: Emanuel Costa  Attending Physician: Mylene Elizalde DO  Operating Physician: Tiffany Benton MD  CSN: [id number]     Location:  OR  MRN: [id number]    YOB: 1985  Admi

## 2020-06-03 NOTE — ANESTHESIA PREPROCEDURE EVALUATION
Anesthesia PreOp Note    HPI:     America Palafox is a 27year old male who presents for preoperative consultation requested by: Kristian Dover MD    Date of Surgery: 5/28/2020 - 6/3/2020    Procedure(s):  ANKLE OPEN REDUCTION INTERNAL FIXATION  Ind Tab, Take 10 mg by mouth daily. , Disp: , Rfl:   folic acid 1 MG Oral Tab, Take by mouth daily. , Disp: , Rfl:   Multiple Vitamin (ONE-DAILY MULTI VITAMINS) Oral Tab, Take 1 tablet by mouth daily. , Disp: , Rfl:   Vitamin B-1 100 MG Oral Tab, Take 100 mg by m mL, 30 mL, Oral, Daily PRN, Lashae Pretty, DO  [MAR Hold] bisacodyl (DULCOLAX) rectal suppository 10 mg, 10 mg, Rectal, Daily PRN, Lashae Pretty, DO  [MAR Hold] Fleet Enema (FLEET) 7-19 GM/118ML enema 133 mL, 1 enema, Rectal, Once PRN, Lashae Pretty, hydrochlorothiazide (MICROZIDE) 12.5 mg for Hyzaar 100/12.5 (EEH only), , Oral, Daily, Humphrey Soares DO  [MAR Hold] Metoprolol Tartrate (LOPRESSOR) tab 100 mg, 100 mg, Oral, BID, Humphrey Soares DO, 100 mg at 06/03/20 0744  [MAR Hold] multivitamin (NIKOLAS Physical activity:        Days per week: Not on file        Minutes per session: Not on file      Stress: Not on file    Relationships      Social connections:        Talks on phone: Not on file        Gets together: Not on file        Attends [employer]  TM distance: >3 FB  Neck ROM: full  Dental - normal exam     Pulmonary - negative ROS and normal exam   Cardiovascular - normal exam  (+) hypertension, CAD,     Neuro/Psych    (+)  CVA,       GI/Hepatic/Renal      Endo/Other    (+) diabetes mellitus t

## 2020-06-03 NOTE — PLAN OF CARE
Problem: Patient Centered Care  Goal: Patient preferences are identified and integrated in the patient's plan of care  Description  Interventions:  - What would you like us to know as we care for you?  Supportive family  - Provide timely, complete, and ac temperature  - Assess for signs of decreased coronary artery perfusion - ex.  Angina  - Evaluate fluid balance, assess for edema, trend weights  Outcome: Progressing  Goal: Absence of cardiac arrhythmias or at baseline  Description  INTERVENTIONS:  - Contin mobility/gait  Description  Interventions:  - Assess patient's functional ability and stability  - Promote increasing activity/tolerance for mobility and gait  - Educate and engage patient/family in tolerated activity level and precautions  Outcome: Lilia

## 2020-06-03 NOTE — PROGRESS NOTES
[facility] AND [facility]  Cardiology Progress Note    Mitzi Mccann Patient Status:  Inpatient    10/30/1985 MRN [id number]   Location [facility] 3W/SW Attending Tanvi Mancini, [address] [facility] Day # 4 PCP No primary care provider on file.        Subject results for input(s): TROP in the last 168 hours.     Allergies:     Mold                    Coughing    Medications:  Insulin Regular Human (NOVOLIN R) 100 UNIT/ML injection 1-11 Units, 1-11 Units, Subcutaneous, 4 times per day  insulin detemir (LEVEMIR) 1 PRN  glucose (DEX4) oral liquid 15 g, 15 g, Oral, Q15 Min PRN    Or  Glucose-Vitamin C (DEX-4) chewable tab 4 tablet, 4 tablet, Oral, Q15 Min PRN    Or  dextrose 50 % injection 50 mL, 50 mL, Intravenous, Q15 Min PRN    Or  glucose (DEX4) oral liquid 30 g,

## 2020-06-04 PROCEDURE — 97530 THERAPEUTIC ACTIVITIES: CPT

## 2020-06-04 PROCEDURE — 85025 COMPLETE CBC W/AUTO DIFF WBC: CPT | Performed by: ORTHOPAEDIC SURGERY

## 2020-06-04 PROCEDURE — 80048 BASIC METABOLIC PNL TOTAL CA: CPT | Performed by: ORTHOPAEDIC SURGERY

## 2020-06-04 PROCEDURE — 82962 GLUCOSE BLOOD TEST: CPT

## 2020-06-04 PROCEDURE — 97110 THERAPEUTIC EXERCISES: CPT

## 2020-06-04 PROCEDURE — S0028 INJECTION, FAMOTIDINE, 20 MG: HCPCS | Performed by: ORTHOPAEDIC SURGERY

## 2020-06-04 RX ORDER — INSULIN ASPART 100 [IU]/ML
8 INJECTION, SOLUTION INTRAVENOUS; SUBCUTANEOUS
Status: DISCONTINUED | OUTPATIENT
Start: 2020-06-04 | End: 2020-06-06 | Stop reason: ALTCHOICE

## 2020-06-04 RX ORDER — SODIUM CHLORIDE 9 MG/ML
INJECTION, SOLUTION INTRAVENOUS
Status: ACTIVE | OUTPATIENT
Start: 2020-06-05 | End: 2020-06-05

## 2020-06-04 RX ORDER — METHYLPREDNISOLONE SODIUM SUCCINATE 40 MG/ML
20 INJECTION, POWDER, LYOPHILIZED, FOR SOLUTION INTRAMUSCULAR; INTRAVENOUS ONCE
Status: COMPLETED | OUTPATIENT
Start: 2020-06-04 | End: 2020-06-04

## 2020-06-04 RX ORDER — ASPIRIN 325 MG
325 TABLET ORAL ONCE
Status: COMPLETED | OUTPATIENT
Start: 2020-06-05 | End: 2020-06-05

## 2020-06-04 RX ORDER — CHLORHEXIDINE GLUCONATE 4 G/100ML
30 SOLUTION TOPICAL
Status: COMPLETED | OUTPATIENT
Start: 2020-06-05 | End: 2020-06-05

## 2020-06-04 RX ORDER — CLOPIDOGREL BISULFATE 75 MG/1
300 TABLET ORAL ONCE
Status: COMPLETED | OUTPATIENT
Start: 2020-06-04 | End: 2020-06-04

## 2020-06-04 NOTE — PLAN OF CARE
- cardiology plan of care  - Dr Daniela Lindquist has d/w patient and ortho planning for staged PCI tomorrow 6/5/20  - ortho OK to plavix load this PM with plavix 300mg PO x 1   - ASA 325mg PO tomorrow morning  - HOLD Eliquis 2.5 mg x 2 doses this PM and tomorrow A

## 2020-06-04 NOTE — PROGRESS NOTES
[place] [facility] - [facility]    Progress note    Gi Dickens Patient Status:  Inpatient    10/30/1985 MRN [id number]   Location [facility] 4W/SW/SE Attending Best Hall, [address] [facility] Day # 5 PCP No primary care provider on file.        Subj Respiratory: Clear to auscultation bilaterally. No wheezes, rales, rhonchi.   Cardiovascular: Regular rate and rhythm, no murmur appreciated, no LE edema  Abdomen: Obese abdomen, soft, nontender, nondistended, normoactive bowel sounds  Neurologic: Streng yesterday is contributing, consider clonidine patch    Chronic stable conditions:  CVA–holding Plavix      Joann Collins,   6/4/2020

## 2020-06-04 NOTE — OCCUPATIONAL THERAPY NOTE
OCCUPATIONAL THERAPY TREATMENT NOTE - INPATIENT        Room Number: 050/223-V    Presenting Problem: (right ankle fracture)    Problem List  Principal Problem:    Closed fracture of right ankle, initial encounter  Active Problems:    Closed right ankle fra training; Endurance training;Functional transfer training;ADL training; Compensatory technique education    SUBJECTIVE  \"It doesn't feel so bad\"    OBJECTIVE  Precautions: Limb alert - right    WEIGHT BEARING RESTRICTION  Weight Bearing Restriction: GOLDY moore

## 2020-06-04 NOTE — PROGRESS NOTES
[place] [facility] - [facility]    Progress Note    Hectorbelinda Tim Patient Status:  Inpatient    10/30/1985 MRN [id number]   Location [facility] 3W/SW Attending Anam Gomez, [address] [facility] Day # 5 PCP No primary care provider on file.      Date of A Oral, Q4H PRN    Or  oxyCODONE HCl (OXY-IR) cap/tab 10 mg, 10 mg, Oral, Q4H PRN    Or  oxyCODONE HCl (OXY-IR) cap/tab 15 mg, 15 mg, Oral, Q4H PRN  docusate sodium (COLACE) cap 100 mg, 100 mg, Oral, BID  PEG 3350 (MIRALAX) powder packet 17 g, 17 g, Oral, Da [COMPLETED] Verapamil HCl (ISOPTIN) 2.5 MG/ML injection, , ,   [COMPLETED] lidocaine (PF) (XYLOCAINE) 2 % injection, , ,   [COMPLETED] Heparin (Porcine) in NaCl 2 UNITS/ML premix flush, , ,   [COMPLETED] Heparin (Porcine) in NaCl 2 UNITS/ML premix flush, tab 1 mg, 1 mg, Oral, Daily  losartan (COZAAR) 100 mg, hydrochlorothiazide (MICROZIDE) 12.5 mg for Hyzaar 100/12.5 (EE[facility] only), , Oral, Daily  Metoprolol Tartrate (LOPRESSOR) tab 100 mg, 100 mg, Oral, BID  multivitamin (ADULT) tab 1 tablet, 1 tablet, Oral, 4.9 06/04/2020     06/04/2020    CO2 27.0 06/04/2020     (H) 06/04/2020    CA 9.9 06/04/2020    PTT 26.9 06/02/2020    INR 0.95 06/03/2020    PTP 12.5 06/03/2020    MG 1.9 05/29/2020         IMAGING:   Xr Fluoroscopy C-arm Time <1 Hour  (cpt=7

## 2020-06-04 NOTE — CM/SW NOTE
Case Management/ Progression of Care    Met with patient to discuss Therapy recommendations for Acute Rehabilitation Vs. Home Health. Patient stated he would prefer to go home then inpatient rehabilitation.    patient stated  He has his life partner who ca

## 2020-06-04 NOTE — PLAN OF CARE
Problem: Patient Centered Care  Goal: Patient preferences are identified and integrated in the patient's plan of care  Description  Interventions:  - What would you like us to know as we care for you?  Supportive family  - Provide timely, complete, and ac emergency measures for life threatening arrhythmias  - Monitor electrolytes and administer replacement therapy as ordered  Outcome: Progressing     Problem: GASTROINTESTINAL - ADULT  Goal: Minimal or absence of nausea and vomiting  Description  INTERVENTIO

## 2020-06-04 NOTE — PHYSICAL THERAPY NOTE
PHYSICAL THERAPY TREATMENT NOTE - INPATIENT     Room Number: 165/302-D       Presenting Problem: fall on stairs, R ankle fx    Problem List  Principal Problem:    Closed fracture of right ankle, initial encounter  Active Problems:    Closed right ankle fra facility. DISCHARGE RECOMMENDATIONS  PT Discharge Recommendations: 24 hour care/supervision;Home with home health PT;Acute rehabilitation(pending progress)     PLAN  PT Treatment Plan: Bed mobility; Body mechanics; Patient education;Gait training;Stair tr End of Session: Up in chair;Needs met;Call light within reach;RN aware of session/findings; All patient questions and concerns addressed    CURRENT GOALS   Goals to be met by: 6/5/20  Patient Goal Patient's self-stated goal is: to be independent with moving

## 2020-06-05 ENCOUNTER — APPOINTMENT (OUTPATIENT)
Dept: INTERVENTIONAL RADIOLOGY/VASCULAR | Facility: HOSPITAL | Age: 35
DRG: 493 | End: 2020-06-05
Attending: NURSE PRACTITIONER
Payer: MEDICARE

## 2020-06-05 PROCEDURE — 85347 COAGULATION TIME ACTIVATED: CPT

## 2020-06-05 PROCEDURE — 4A023N7 MEASUREMENT OF CARDIAC SAMPLING AND PRESSURE, LEFT HEART, PERCUTANEOUS APPROACH: ICD-10-PCS | Performed by: INTERNAL MEDICINE

## 2020-06-05 PROCEDURE — 82962 GLUCOSE BLOOD TEST: CPT

## 2020-06-05 PROCEDURE — 99153 MOD SED SAME PHYS/QHP EA: CPT

## 2020-06-05 PROCEDURE — 99152 MOD SED SAME PHYS/QHP 5/>YRS: CPT

## 2020-06-05 PROCEDURE — 80048 BASIC METABOLIC PNL TOTAL CA: CPT | Performed by: ORTHOPAEDIC SURGERY

## 2020-06-05 PROCEDURE — 92921 HC PTCA EA ADDL MAJOR CORONARY ARTERY/BRANCH: CPT

## 2020-06-05 PROCEDURE — 027135Z DILATION OF CORONARY ARTERY, TWO ARTERIES WITH TWO DRUG-ELUTING INTRALUMINAL DEVICES, PERCUTANEOUS APPROACH: ICD-10-PCS | Performed by: INTERNAL MEDICINE

## 2020-06-05 RX ORDER — HEPARIN SODIUM 1000 [USP'U]/ML
INJECTION, SOLUTION INTRAVENOUS; SUBCUTANEOUS
Status: COMPLETED
Start: 2020-06-05 | End: 2020-06-05

## 2020-06-05 RX ORDER — ASPIRIN 81 MG/1
81 TABLET ORAL DAILY
Status: DISCONTINUED | OUTPATIENT
Start: 2020-06-06 | End: 2020-06-06

## 2020-06-05 RX ORDER — HYDROCODONE BITARTRATE AND ACETAMINOPHEN 10; 325 MG/1; MG/1
1 TABLET ORAL EVERY 6 HOURS PRN
Status: DISCONTINUED | OUTPATIENT
Start: 2020-06-05 | End: 2020-06-06

## 2020-06-05 RX ORDER — NITROGLYCERIN 20 MG/100ML
INJECTION INTRAVENOUS
Status: COMPLETED
Start: 2020-06-05 | End: 2020-06-05

## 2020-06-05 RX ORDER — LIDOCAINE HYDROCHLORIDE 20 MG/ML
INJECTION, SOLUTION EPIDURAL; INFILTRATION; INTRACAUDAL; PERINEURAL
Status: COMPLETED
Start: 2020-06-05 | End: 2020-06-05

## 2020-06-05 RX ORDER — VERAPAMIL HYDROCHLORIDE 2.5 MG/ML
INJECTION, SOLUTION INTRAVENOUS
Status: DISCONTINUED
Start: 2020-06-05 | End: 2020-06-05 | Stop reason: WASHOUT

## 2020-06-05 RX ORDER — MIDAZOLAM HYDROCHLORIDE 1 MG/ML
INJECTION INTRAMUSCULAR; INTRAVENOUS
Status: COMPLETED
Start: 2020-06-05 | End: 2020-06-05

## 2020-06-05 RX ORDER — CLOPIDOGREL BISULFATE 75 MG/1
75 TABLET ORAL DAILY
Status: DISCONTINUED | OUTPATIENT
Start: 2020-06-06 | End: 2020-06-06

## 2020-06-05 RX ORDER — SODIUM CHLORIDE 9 MG/ML
INJECTION, SOLUTION INTRAVENOUS CONTINUOUS
Status: ACTIVE | OUTPATIENT
Start: 2020-06-05 | End: 2020-06-05

## 2020-06-05 RX ORDER — CLOPIDOGREL BISULFATE 75 MG/1
TABLET ORAL
Status: COMPLETED
Start: 2020-06-05 | End: 2020-06-05

## 2020-06-05 NOTE — PLAN OF CARE
Problem: Patient Centered Care  Goal: Patient preferences are identified and integrated in the patient's plan of care  Description  Interventions:  - What would you like us to know as we care for you?  Supportive family  - Provide timely, complete, and ac antiarrhythmic and heart rate control medications as ordered  - Initiate emergency measures for life threatening arrhythmias  - Monitor electrolytes and administer replacement therapy as ordered  Outcome: Progressing  Note:   SR on tele  Electrolytes WNL

## 2020-06-05 NOTE — PLAN OF CARE
Problem: Patient/Family Goals  Goal: Patient/Family Long Term Goal  Description  Patient's Long Term Goal: regain full mobility  Interventions:    - PT/OT  - Rehab  - Follow up with MD    - See additional Care Plan goals for specific interventions   Outc

## 2020-06-05 NOTE — PROCEDURES
[facility]    [facility] Cardiac Cath Procedure Note  [address] Patient Status:  Inpatient    10/30/1985 MRN [id number]   Location [facility] Attending Florian Renteria, [address] [facility]note 2[facility] Day # 6 PCP No primary tortuosity   Stent: 3.0 x 12 mm SYNERGY LA  Post-dil:  none  Post stent deployment, hazy residual 40% stenosis proximal to the stent. Residual, probably significant mid OM lesion 70-80% stenosis. Decided to stop due to radiation dose to the patient.

## 2020-06-05 NOTE — PROGRESS NOTES
[facility]    Progress note    Karenmatthew Greers Patient Status:  Inpatient    10/30/1985 MRN [id number]   Location [facility] 4W/SW/SE Attending Cas Branham, [address] [facility] Day # 6 PCP No primary care provider on file.        Subj rhonchi.   Cardiovascular: Mildly tachycardic, regular rhythm, no murmur appreciated, no LE edema  Abdomen: Obese abdomen, soft, nontender, nondistended, normoactive bowel sounds  Neurologic: Strength is 5/5 in bilateral upper extremities and in left lower

## 2020-06-05 NOTE — PROGRESS NOTES
Emanuel Costa  [id number]  6/5/2020    Post procedure/ recovery hand-off report given to Marlena Heath. Patient's vital signs stable, access site dry and intact, no signs and symptoms of bleeding/ hematoma.     Angel Prasad RN

## 2020-06-06 VITALS
OXYGEN SATURATION: 100 % | DIASTOLIC BLOOD PRESSURE: 98 MMHG | HEART RATE: 88 BPM | HEIGHT: 74 IN | RESPIRATION RATE: 18 BRPM | TEMPERATURE: 99 F | WEIGHT: 315 LBS | SYSTOLIC BLOOD PRESSURE: 164 MMHG | BODY MASS INDEX: 40.43 KG/M2

## 2020-06-06 LAB
ANION GAP SERPL CALC-SCNC: 3 MMOL/L
BUN SERPL-MCNC: 13 MG/DL
BUN/CREAT SERPL: 13
CALCIUM SERPL-MCNC: 9 MG/DL
CHLORIDE SERPL-SCNC: 104 MMOL/L
CO2 SERPL-SCNC: 31 MMOL/L
CREAT SERPL-MCNC: 1 MG/DL
GLUCOSE SERPL-MCNC: 214 MG/DL
HCT VFR BLD CALC: 39.3 %
HGB BLD-MCNC: 12.9 G/DL
PLATELET # BLD: 252 10*3/UL
POTASSIUM SERPL-SCNC: 3.9 MMOL/L
RBC # BLD: 4.33 10*6/UL
SODIUM SERPL-SCNC: 138 MMOL/L
WBC # BLD: 5.8 10*3/UL

## 2020-06-06 PROCEDURE — 97530 THERAPEUTIC ACTIVITIES: CPT

## 2020-06-06 PROCEDURE — 82962 GLUCOSE BLOOD TEST: CPT

## 2020-06-06 PROCEDURE — 80048 BASIC METABOLIC PNL TOTAL CA: CPT | Performed by: ORTHOPAEDIC SURGERY

## 2020-06-06 PROCEDURE — 85025 COMPLETE CBC W/AUTO DIFF WBC: CPT | Performed by: INTERNAL MEDICINE

## 2020-06-06 RX ORDER — HYDROCODONE BITARTRATE AND ACETAMINOPHEN 5; 325 MG/1; MG/1
2 TABLET ORAL EVERY 6 HOURS PRN
Qty: 30 TABLET | Refills: 0 | Status: SHIPPED | OUTPATIENT
Start: 2020-06-06 | End: 2020-06-07

## 2020-06-06 RX ORDER — ASPIRIN 81 MG/1
81 TABLET ORAL DAILY
Qty: 30 TABLET | Refills: 3 | Status: SHIPPED | OUTPATIENT
Start: 2020-06-07

## 2020-06-06 RX ORDER — POLYETHYLENE GLYCOL 3350 17 G/17G
17 POWDER, FOR SOLUTION ORAL DAILY PRN
Refills: 0 | Status: SHIPPED | COMMUNITY
Start: 2020-06-06 | End: 2020-11-11 | Stop reason: ALTCHOICE

## 2020-06-06 RX ORDER — INSULIN ASPART 100 [IU]/ML
8 INJECTION, SOLUTION INTRAVENOUS; SUBCUTANEOUS
Qty: 7.2 ML | Refills: 0 | Status: SHIPPED | OUTPATIENT
Start: 2020-06-06 | End: 2020-06-07

## 2020-06-06 RX ORDER — PSEUDOEPHEDRINE HCL 30 MG
100 TABLET ORAL 2 TIMES DAILY
Refills: 0 | Status: SHIPPED | COMMUNITY
Start: 2020-06-06 | End: 2020-11-11

## 2020-06-06 RX ORDER — CLOPIDOGREL BISULFATE 75 MG/1
75 TABLET ORAL DAILY
Qty: 30 TABLET | Refills: 3 | Status: SHIPPED | OUTPATIENT
Start: 2020-06-07

## 2020-06-06 RX ORDER — HYDRALAZINE HYDROCHLORIDE 100 MG/1
100 TABLET, FILM COATED ORAL EVERY 8 HOURS SCHEDULED
Qty: 90 TABLET | Refills: 3 | Status: SHIPPED | OUTPATIENT
Start: 2020-06-06 | End: 2020-06-07

## 2020-06-06 RX ORDER — ISOSORBIDE MONONITRATE 120 MG/1
120 TABLET, EXTENDED RELEASE ORAL DAILY
Qty: 30 TABLET | Refills: 3 | Status: SHIPPED | OUTPATIENT
Start: 2020-06-07

## 2020-06-06 NOTE — DIETARY NOTE
Educated pt on basics of cardiac diet and carbohydrate controlled. Handout and contact information provided.      [address], [address] [facility]   Clinical Dietitian [id number]

## 2020-06-06 NOTE — PLAN OF CARE
Patient is anxious to go home and refused acute rehab; patient would rather go home.   This writer witnessed patient moving from bed to chair and was extremely unsteady, shaking the walker while trying to move with one foot (other cannot have any weight put Short Term Goal  Description  Patient's Short Term Goal: Be discharged Home     Interventions:   - Cardiac Cath  - Diabetes control  - PT/OT  - MD recommendations  - See additional Care Plan goals for specific interventions   Outcome: Adequate for Discharg to void and empty bladder  - Monitor intake/output and perform bladder scan as needed  - Follow urinary retention protocol/standard of care  - Consider collaborating with pharmacy to review patient's medication profile  - Implement strategies to promote bl

## 2020-06-06 NOTE — PHYSICAL THERAPY NOTE
11 17 16 IMPROVED RECOM REDUCING PF TO TID X 1 MONTH THEN BID. PHYSICAL THERAPY TREATMENT NOTE - INPATIENT     Room Number: 468/362-L       Presenting Problem: fall on stairs, R ankle fx    Problem List  Principal Problem:    Closed fracture of right ankle, initial encounter  Active Problems:    Closed right ankle fra Lower Extremity: Non-Weight Bearing       PAIN ASSESSMENT   Ratin  Location: R ankle  Management Techniques: Activity promotion; Body mechanics; Relaxation;Repositioning    BALANCE demonstrate transfers EOB to/from Chair/Wheelchair at assistance level: supervision with none      Goal #2  Current Status Min A x 2 with rolling walker       Goal #3 Patient is able to ambulate 10 feet with assist device:bariatric walker - rolling at [facility]

## 2020-06-06 NOTE — PROGRESS NOTES
[facility]    Progress note    Flo Barnes Patient Status:  Inpatient    10/30/1985 MRN [id number]   Location [facility] 4W/SW/SE Attending Lilia Frankel, [address] [facility] Day # 7 PCP No primary care provider on file.        Subj tachycardic, regular rhythm, no murmur appreciated, no LE edema  Abdomen: Obese abdomen, soft, nontender, nondistended, normoactive bowel sounds  Neurologic: Strength is 5/5 in bilateral upper extremities and in left lower extremity, however in left upper

## 2020-06-06 NOTE — PLAN OF CARE
Problem: Patient Centered Care  Goal: Patient preferences are identified and integrated in the patient's plan of care  Description  Interventions:  - What would you like us to know as we care for you?  Supportive family  - Provide timely, complete, and ac and/or venous puncture sites for bleeding and/or hematoma  - Assess quality of pulses, skin color and temperature  - Assess for signs of decreased coronary artery perfusion - ex.  Angina  - Evaluate fluid balance, assess for edema, trend weights  Outcome: P Progressing     Problem: Impaired Functional Mobility  Goal: Achieve highest/safest level of mobility/gait  Description  Interventions:  - Assess patient's functional ability and stability  - Promote increasing activity/tolerance for mobility and gait  - E

## 2020-06-06 NOTE — PROGRESS NOTES
[facility] - [facility]    Cardiology Progress Note    Americaalexandria Palafox Patient Status:  Inpatient    10/30/1985 MRN [id number]   Location [facility] 3W/SW Attending Mike Fraser, [address] [facility] Day # 7 PCP No primary care provider on file. insulin detemir  40 Units Subcutaneous Daily   • Amitriptyline HCl  25 mg Oral Nightly   • amLODIPine Besylate  10 mg Oral Daily   • atorvastatin  80 mg Oral Daily   • DULoxetine HCl  60 mg Oral BID   • folic acid  1 mg Oral Daily   • losartan-hydrochlorot off DAPT at this time        Results:     Lab Results   Component Value Date    WBC 5.8 06/06/2020    HGB 12.9 (L) 06/06/2020    HCT 39.3 06/06/2020    .0 06/06/2020    CREATSERUM 1.00 06/06/2020    BUN 13 06/06/2020     06/06/2020    K 3.9 06

## 2020-06-06 NOTE — PROGRESS NOTES
[place] [facility] - [facility]    Progress Note    Henny Madden Patient Status:  Inpatient    10/30/1985 MRN [id number]   Location [facility] 3W/SW Attending Jozef Caal, 160[address] [facility] Day # 7 PCP No primary care provider on file.      Date of A ONCE PRN  [] 0.9% NaCl infusion, , Intravenous, Continuous  Clopidogrel Bisulfate (PLAVIX) tab 75 mg, 75 mg, Oral, Daily  aspirin EC tab 81 mg, 81 mg, Oral, Daily  HYDROcodone-acetaminophen (NORCO)  MG per tab 1 tablet, 1 tablet, Oral, Q6H PRN injection 20 mg, 20 mg, Intravenous, BID  [] ceFAZolin in NS (ANCEF) 3g/100mL IVPB premix, 3 g, Intravenous, Q8H  [COMPLETED] ceFAZolin in NS (ANCEF) 3g/100mL IVPB premix, 3 g, Intravenous, Once  [COMPLETED] diphenhydrAMINE HCl (BENADRYL) injection Once  [COMPLETED] Insulin Aspart Pen (NOVOLOG) 100 UNIT/ML flexpen 10 Units, 10 Units, Subcutaneous, Once  [COMPLETED] Insulin Aspart Pen (NOVOLOG) 100 UNIT/ML flexpen 10 Units, 10 Units, Subcutaneous, Once  Normal Saline Flush 0.9 % injection 3 mL, 3 mL, - 6.0 standard drinks      Types: 5 - 6 Cans of beer per week      Frequency: 4 or more times a week      Drinks per session: 5 or 6      Binge frequency: Weekly    Drug use: Not on file       PHYSICAL EXAM:   BP (!) 168/97 (BP Location: Right arm)   Pulse note was proofread for content only. Typographical errors may remain.       Isabella Manjarrez MD  6/6/2020

## 2020-06-06 NOTE — CM/SW NOTE
06/06/20 1600   Discharge disposition   Expected discharge disposition Home-Health   Name of Facillity/Home Care/Hospice ATI   Discharge transportation Private car   MDO received for discharge, Patient discharging home with [facility].  Patient dec

## 2020-06-07 RX ORDER — HYDRALAZINE HYDROCHLORIDE 100 MG/1
100 TABLET, FILM COATED ORAL EVERY 8 HOURS SCHEDULED
Qty: 90 TABLET | Refills: 0 | Status: SHIPPED | OUTPATIENT
Start: 2020-06-07 | End: 2020-07-07

## 2020-06-07 RX ORDER — INSULIN ASPART 100 [IU]/ML
8 INJECTION, SOLUTION INTRAVENOUS; SUBCUTANEOUS
Qty: 7.2 ML | Refills: 0 | Status: SHIPPED | OUTPATIENT
Start: 2020-06-07 | End: 2020-07-07

## 2020-06-07 RX ORDER — HYDROCODONE BITARTRATE AND ACETAMINOPHEN 5; 325 MG/1; MG/1
2 TABLET ORAL EVERY 6 HOURS PRN
Qty: 30 TABLET | Refills: 0 | Status: SHIPPED | OUTPATIENT
Start: 2020-06-07 | End: 2020-11-11 | Stop reason: ALTCHOICE

## 2020-06-08 ENCOUNTER — TELEPHONE (OUTPATIENT)
Dept: MEDSURG UNIT | Facility: HOSPITAL | Age: 35
End: 2020-06-08

## 2020-06-08 ENCOUNTER — TELEPHONE (OUTPATIENT)
Dept: ORTHOPEDICS CLINIC | Facility: CLINIC | Age: 35
End: 2020-06-08

## 2020-06-08 ENCOUNTER — PATIENT OUTREACH (OUTPATIENT)
Dept: CASE MANAGEMENT | Age: 35
End: 2020-06-08

## 2020-06-08 NOTE — PROGRESS NOTES
1st attempt [facility] wound check apt    LISANDRA FLORES [facility]  [address] [place], [place] [address] [phone]    Apt made for Mon. June 15th @2:30pm

## 2020-06-08 NOTE — TELEPHONE ENCOUNTER
S/w Ly who states pt broke his ankle on 5/28 after missing a step. Pt went to [facility] and had sx on 6/3 by Dr. Chelsey White. She states d/c instructions say to f/u with Dr. Angel Kowalski.  I informed her that must have be in error and gave her phone # to Dr. Eneida Hunt

## 2020-06-10 PROBLEM — Z79.4 TYPE 2 DIABETES MELLITUS WITH CIRCULATORY DISORDER, WITH LONG-TERM CURRENT USE OF INSULIN (CMD): Status: ACTIVE | Noted: 2020-06-10

## 2020-06-10 PROBLEM — I63.9 CVA (CEREBRAL VASCULAR ACCIDENT) (CMD): Status: ACTIVE | Noted: 2020-06-10

## 2020-06-10 PROBLEM — E78.00 PURE HYPERCHOLESTEROLEMIA: Status: ACTIVE | Noted: 2020-06-10

## 2020-06-10 PROBLEM — Z95.5 PRESENCE OF CORONARY ANGIOPLASTY IMPLANT AND GRAFT: Status: ACTIVE | Noted: 2020-06-10

## 2020-06-10 PROBLEM — I25.10 CORONARY ARTERY DISEASE INVOLVING NATIVE CORONARY ARTERY OF NATIVE HEART WITHOUT ANGINA PECTORIS: Status: ACTIVE | Noted: 2020-06-10

## 2020-06-10 PROBLEM — I10 ESSENTIAL HYPERTENSION: Status: ACTIVE | Noted: 2020-06-10

## 2020-06-10 PROBLEM — E11.59 TYPE 2 DIABETES MELLITUS WITH CIRCULATORY DISORDER, WITH LONG-TERM CURRENT USE OF INSULIN (CMD): Status: ACTIVE | Noted: 2020-06-10

## 2020-06-11 ENCOUNTER — CLINICAL ABSTRACT (OUTPATIENT)
Dept: CARDIOLOGY | Age: 35
End: 2020-06-11

## 2020-06-11 PROBLEM — S82.841A CLOSED BIMALLEOLAR FRACTURE OF RIGHT ANKLE: Status: ACTIVE | Noted: 2020-05-31

## 2020-06-11 RX ORDER — HYDROCODONE BITARTRATE AND ACETAMINOPHEN 5; 325 MG/1; MG/1
TABLET ORAL
COMMUNITY
Start: 2020-06-07 | End: 2020-10-02

## 2020-06-11 RX ORDER — ISOSORBIDE MONONITRATE 120 MG/1
120 TABLET, EXTENDED RELEASE ORAL
COMMUNITY
Start: 2020-06-07 | End: 2020-06-15 | Stop reason: ALTCHOICE

## 2020-06-11 RX ORDER — PRIMIDONE 50 MG/1
TABLET ORAL
COMMUNITY
Start: 2019-10-09 | End: 2020-06-15

## 2020-06-11 RX ORDER — FOLIC ACID 1 MG/1
TABLET ORAL DAILY
COMMUNITY
Start: 2019-07-08

## 2020-06-11 RX ORDER — GLIPIZIDE 10 MG/1
TABLET ORAL DAILY
COMMUNITY

## 2020-06-11 RX ORDER — GABAPENTIN 300 MG/1
CAPSULE ORAL
COMMUNITY
End: 2020-06-15

## 2020-06-11 RX ORDER — AMLODIPINE BESYLATE 10 MG/1
TABLET ORAL DAILY
COMMUNITY
Start: 2019-07-08

## 2020-06-11 RX ORDER — CLOPIDOGREL BISULFATE 75 MG/1
TABLET ORAL DAILY
COMMUNITY
Start: 2020-01-20

## 2020-06-11 RX ORDER — INSULIN ASPART 100 [IU]/ML
8 INJECTION, SOLUTION INTRAVENOUS; SUBCUTANEOUS
COMMUNITY
Start: 2020-06-07 | End: 2020-07-01

## 2020-06-11 RX ORDER — LISINOPRIL 40 MG/1
TABLET ORAL
COMMUNITY
End: 2020-06-15 | Stop reason: CLARIF

## 2020-06-11 RX ORDER — HYDROCHLOROTHIAZIDE 12.5 MG/1
TABLET ORAL
COMMUNITY
Start: 2020-04-29 | End: 2020-06-15

## 2020-06-11 RX ORDER — DIPHENOXYLATE HYDROCHLORIDE AND ATROPINE SULFATE 2.5; .025 MG/1; MG/1
TABLET ORAL
COMMUNITY
End: 2020-06-15

## 2020-06-11 RX ORDER — METOPROLOL TARTRATE 100 MG/1
150 TABLET ORAL 2 TIMES DAILY
COMMUNITY
Start: 2019-11-05 | End: 2020-11-20 | Stop reason: SDUPTHER

## 2020-06-11 RX ORDER — HYDRALAZINE HYDROCHLORIDE 100 MG/1
100 TABLET, FILM COATED ORAL 3 TIMES DAILY
COMMUNITY
Start: 2020-06-07 | End: 2020-09-01

## 2020-06-11 RX ORDER — ALBUTEROL SULFATE 90 UG/1
2 AEROSOL, METERED RESPIRATORY (INHALATION) PRN
COMMUNITY
Start: 2019-10-29 | End: 2020-07-01

## 2020-06-11 RX ORDER — POLYETHYLENE GLYCOL 3350 17 G/17G
17 POWDER, FOR SOLUTION ORAL
COMMUNITY
Start: 2020-06-06 | End: 2020-06-15

## 2020-06-11 RX ORDER — MULTIVITAMIN
1 TABLET ORAL
COMMUNITY

## 2020-06-11 RX ORDER — INSULIN GLARGINE 100 [IU]/ML
40 INJECTION, SOLUTION SUBCUTANEOUS
COMMUNITY
Start: 2019-12-18 | End: 2020-07-07

## 2020-06-11 RX ORDER — PSEUDOEPHEDRINE HCL 30 MG
100 TABLET ORAL
COMMUNITY
Start: 2020-06-06 | End: 2020-06-15

## 2020-06-11 RX ORDER — ATORVASTATIN CALCIUM 80 MG/1
TABLET, FILM COATED ORAL DAILY
COMMUNITY
Start: 2020-01-20

## 2020-06-11 RX ORDER — ASPIRIN 81 MG/1
81 TABLET ORAL DAILY
COMMUNITY
Start: 2020-06-07 | End: 2020-11-20 | Stop reason: SDUPTHER

## 2020-06-11 RX ORDER — THIAMINE MONONITRATE (VIT B1) 100 MG
100 TABLET ORAL DAILY
COMMUNITY
Start: 2014-04-11

## 2020-06-11 RX ORDER — ISOSORBIDE MONONITRATE 60 MG/1
60 TABLET, EXTENDED RELEASE ORAL DAILY
COMMUNITY
Start: 2019-12-03

## 2020-06-15 ENCOUNTER — OFFICE VISIT (OUTPATIENT)
Dept: CARDIOLOGY | Age: 35
End: 2020-06-15

## 2020-06-15 VITALS
WEIGHT: 315 LBS | HEART RATE: 91 BPM | DIASTOLIC BLOOD PRESSURE: 80 MMHG | RESPIRATION RATE: 18 BRPM | HEIGHT: 74 IN | SYSTOLIC BLOOD PRESSURE: 140 MMHG | BODY MASS INDEX: 40.43 KG/M2 | OXYGEN SATURATION: 98 %

## 2020-06-15 DIAGNOSIS — E78.00 PURE HYPERCHOLESTEROLEMIA: ICD-10-CM

## 2020-06-15 DIAGNOSIS — I25.10 CORONARY ARTERY DISEASE INVOLVING NATIVE CORONARY ARTERY OF NATIVE HEART WITHOUT ANGINA PECTORIS: Primary | ICD-10-CM

## 2020-06-15 DIAGNOSIS — I10 ESSENTIAL HYPERTENSION: ICD-10-CM

## 2020-06-15 DIAGNOSIS — Z95.5 PRESENCE OF CORONARY ANGIOPLASTY IMPLANT AND GRAFT: ICD-10-CM

## 2020-06-15 PROBLEM — J32.9 CHRONIC SINUSITIS: Status: ACTIVE | Noted: 2018-12-13

## 2020-06-15 PROBLEM — H43.823 VITREOMACULAR ADHESION OF BOTH EYES: Status: ACTIVE | Noted: 2017-09-01

## 2020-06-15 PROBLEM — J30.9 ALLERGIC RHINITIS: Status: ACTIVE | Noted: 2018-11-16

## 2020-06-15 PROBLEM — E78.1 HYPERTRIGLYCERIDEMIA: Status: ACTIVE | Noted: 2020-06-15

## 2020-06-15 PROBLEM — I63.9 CVA (CEREBRAL VASCULAR ACCIDENT) (HCC): Status: ACTIVE | Noted: 2020-06-10

## 2020-06-15 PROBLEM — J96.90 RESPIRATORY FAILURE REQUIRING INTUBATION (HCC): Status: ACTIVE | Noted: 2020-06-15

## 2020-06-15 PROCEDURE — 99204 OFFICE O/P NEW MOD 45 MIN: CPT | Performed by: NURSE PRACTITIONER

## 2020-06-15 RX ORDER — LOSARTAN POTASSIUM AND HYDROCHLOROTHIAZIDE 25; 100 MG/1; MG/1
1 TABLET ORAL DAILY
COMMUNITY
Start: 2020-05-03

## 2020-06-15 ASSESSMENT — PATIENT HEALTH QUESTIONNAIRE - PHQ9
1. LITTLE INTEREST OR PLEASURE IN DOING THINGS: NOT AT ALL
CLINICAL INTERPRETATION OF PHQ9 SCORE: NO FURTHER SCREENING NEEDED
2. FEELING DOWN, DEPRESSED OR HOPELESS: NOT AT ALL
SUM OF ALL RESPONSES TO PHQ9 QUESTIONS 1 AND 2: 0
SUM OF ALL RESPONSES TO PHQ9 QUESTIONS 1 AND 2: 0
CLINICAL INTERPRETATION OF PHQ2 SCORE: NO FURTHER SCREENING NEEDED

## 2020-06-30 ENCOUNTER — TELEPHONE (OUTPATIENT)
Dept: CARDIOLOGY | Age: 35
End: 2020-06-30

## 2020-07-01 ENCOUNTER — OFFICE VISIT (OUTPATIENT)
Dept: CARDIOLOGY | Age: 35
End: 2020-07-01

## 2020-07-01 VITALS
OXYGEN SATURATION: 100 % | HEART RATE: 89 BPM | SYSTOLIC BLOOD PRESSURE: 128 MMHG | HEIGHT: 74 IN | DIASTOLIC BLOOD PRESSURE: 90 MMHG | BODY MASS INDEX: 40.43 KG/M2 | WEIGHT: 315 LBS

## 2020-07-01 DIAGNOSIS — I10 ESSENTIAL HYPERTENSION: ICD-10-CM

## 2020-07-01 DIAGNOSIS — E78.00 PURE HYPERCHOLESTEROLEMIA: ICD-10-CM

## 2020-07-01 DIAGNOSIS — I25.10 CORONARY ARTERY DISEASE INVOLVING NATIVE CORONARY ARTERY OF NATIVE HEART WITHOUT ANGINA PECTORIS: Primary | ICD-10-CM

## 2020-07-01 PROCEDURE — 99214 OFFICE O/P EST MOD 30 MIN: CPT | Performed by: INTERNAL MEDICINE

## 2020-07-01 ASSESSMENT — PATIENT HEALTH QUESTIONNAIRE - PHQ9
CLINICAL INTERPRETATION OF PHQ2 SCORE: NO FURTHER SCREENING NEEDED
1. LITTLE INTEREST OR PLEASURE IN DOING THINGS: NOT AT ALL
SUM OF ALL RESPONSES TO PHQ9 QUESTIONS 1 AND 2: 0
SUM OF ALL RESPONSES TO PHQ9 QUESTIONS 1 AND 2: 0
2. FEELING DOWN, DEPRESSED OR HOPELESS: NOT AT ALL
CLINICAL INTERPRETATION OF PHQ9 SCORE: NO FURTHER SCREENING NEEDED

## 2020-08-10 PROBLEM — S82.841D CLOSED DISPLACED BIMALLEOLAR FRACTURE OF RIGHT LOWER LEG WITH ROUTINE HEALING: Status: ACTIVE | Noted: 2020-05-31

## 2020-09-01 RX ORDER — HYDRALAZINE HYDROCHLORIDE 100 MG/1
TABLET, FILM COATED ORAL
Qty: 270 TABLET | Refills: 0 | Status: SHIPPED | OUTPATIENT
Start: 2020-09-01 | End: 2020-12-07 | Stop reason: SDUPTHER

## 2020-09-23 RX ORDER — DULOXETIN HYDROCHLORIDE 60 MG/1
1 CAPSULE, DELAYED RELEASE ORAL 2 TIMES DAILY
COMMUNITY
Start: 2020-08-31

## 2020-09-23 RX ORDER — AMITRIPTYLINE HYDROCHLORIDE 25 MG/1
TABLET, FILM COATED ORAL
COMMUNITY
Start: 2020-08-01

## 2020-09-23 RX ORDER — HYDROCHLOROTHIAZIDE 12.5 MG/1
1 TABLET ORAL DAILY
COMMUNITY
Start: 2020-08-03 | End: 2020-11-06

## 2020-10-02 ENCOUNTER — OFFICE VISIT (OUTPATIENT)
Dept: CARDIOLOGY | Age: 35
End: 2020-10-02

## 2020-10-02 VITALS
HEIGHT: 74 IN | OXYGEN SATURATION: 96 % | SYSTOLIC BLOOD PRESSURE: 162 MMHG | DIASTOLIC BLOOD PRESSURE: 110 MMHG | WEIGHT: 315 LBS | BODY MASS INDEX: 40.43 KG/M2 | HEART RATE: 91 BPM

## 2020-10-02 DIAGNOSIS — I25.118 CORONARY ARTERY DISEASE OF NATIVE ARTERY OF NATIVE HEART WITH STABLE ANGINA PECTORIS (CMD): Primary | ICD-10-CM

## 2020-10-02 DIAGNOSIS — I10 ESSENTIAL HYPERTENSION: ICD-10-CM

## 2020-10-02 DIAGNOSIS — E78.00 PURE HYPERCHOLESTEROLEMIA: ICD-10-CM

## 2020-10-02 PROCEDURE — 99214 OFFICE O/P EST MOD 30 MIN: CPT | Performed by: INTERNAL MEDICINE

## 2020-10-02 ASSESSMENT — PATIENT HEALTH QUESTIONNAIRE - PHQ9
2. FEELING DOWN, DEPRESSED OR HOPELESS: NOT AT ALL
CLINICAL INTERPRETATION OF PHQ9 SCORE: NO FURTHER SCREENING NEEDED
SUM OF ALL RESPONSES TO PHQ9 QUESTIONS 1 AND 2: 0
SUM OF ALL RESPONSES TO PHQ9 QUESTIONS 1 AND 2: 0
CLINICAL INTERPRETATION OF PHQ2 SCORE: NO FURTHER SCREENING NEEDED
1. LITTLE INTEREST OR PLEASURE IN DOING THINGS: NOT AT ALL

## 2020-10-05 NOTE — DISCHARGE SUMMARY
[place] [facility] - [facility]    Discharge Summary    Gi Dickens Patient Status:  Inpatient    10/30/1985 MRN [id number]   Location [facility] 3W/SW Attending No att. providers found   Hosp Day # 7 PCP Jaquan MENDOZA     Date of Admission:  6/3/20 ANKLE OPEN REDUCTION INTERNAL FIXATION Rema Cox  [facility] 3[facility] MAIN OR Comp            Discharge Plan:   Discharge Condition: Stable    Discharge Medication List as of 6/6/2020  2:54 PM    New Orders    aspirin 81 MG Oral Tab EC  Take 1 tablet (81 60 MG Oral Tablet 24 Hr  Take 60 mg by mouth daily. , Historical    Metoprolol Tartrate 100 MG Oral Tab  Take 100 mg by mouth 2 (two) times daily. , Historical    Amitriptyline HCl 25 MG Oral Tab  Take 25 mg by mouth nightly., Historical    amLODIPine Besyla

## 2020-11-06 ENCOUNTER — TELEPHONE (OUTPATIENT)
Dept: CARDIOLOGY | Age: 35
End: 2020-11-06

## 2020-11-06 ENCOUNTER — OFFICE VISIT (OUTPATIENT)
Dept: CARDIOLOGY | Age: 35
End: 2020-11-06

## 2020-11-06 VITALS
HEIGHT: 74 IN | OXYGEN SATURATION: 98 % | HEART RATE: 92 BPM | DIASTOLIC BLOOD PRESSURE: 90 MMHG | SYSTOLIC BLOOD PRESSURE: 142 MMHG | WEIGHT: 315 LBS | BODY MASS INDEX: 40.43 KG/M2

## 2020-11-06 DIAGNOSIS — R06.00 DYSPNEA, UNSPECIFIED TYPE: ICD-10-CM

## 2020-11-06 DIAGNOSIS — R07.89 CHEST DISCOMFORT: ICD-10-CM

## 2020-11-06 DIAGNOSIS — I25.118 CORONARY ARTERY DISEASE OF NATIVE ARTERY OF NATIVE HEART WITH STABLE ANGINA PECTORIS (CMD): Primary | ICD-10-CM

## 2020-11-06 DIAGNOSIS — Z79.4 TYPE 2 DIABETES MELLITUS WITH OTHER CIRCULATORY COMPLICATION, WITH LONG-TERM CURRENT USE OF INSULIN (CMD): ICD-10-CM

## 2020-11-06 DIAGNOSIS — I10 ESSENTIAL HYPERTENSION: ICD-10-CM

## 2020-11-06 DIAGNOSIS — E11.59 TYPE 2 DIABETES MELLITUS WITH OTHER CIRCULATORY COMPLICATION, WITH LONG-TERM CURRENT USE OF INSULIN (CMD): ICD-10-CM

## 2020-11-06 PROCEDURE — 99214 OFFICE O/P EST MOD 30 MIN: CPT | Performed by: INTERNAL MEDICINE

## 2020-11-06 ASSESSMENT — PATIENT HEALTH QUESTIONNAIRE - PHQ9
2. FEELING DOWN, DEPRESSED OR HOPELESS: NOT AT ALL
CLINICAL INTERPRETATION OF PHQ9 SCORE: NO FURTHER SCREENING NEEDED
1. LITTLE INTEREST OR PLEASURE IN DOING THINGS: NOT AT ALL
SUM OF ALL RESPONSES TO PHQ9 QUESTIONS 1 AND 2: 0
CLINICAL INTERPRETATION OF PHQ2 SCORE: NO FURTHER SCREENING NEEDED
SUM OF ALL RESPONSES TO PHQ9 QUESTIONS 1 AND 2: 0

## 2020-11-10 ENCOUNTER — LAB ENCOUNTER (OUTPATIENT)
Dept: LAB | Facility: HOSPITAL | Age: 35
End: 2020-11-10
Attending: INTERNAL MEDICINE
Payer: MEDICARE

## 2020-11-10 ENCOUNTER — HOSPITAL ENCOUNTER (OUTPATIENT)
Dept: GENERAL RADIOLOGY | Facility: HOSPITAL | Age: 35
Discharge: HOME | End: 2020-11-10
Attending: INTERNAL MEDICINE
Payer: MEDICARE

## 2020-11-10 DIAGNOSIS — Z01.818 PREOP TESTING: ICD-10-CM

## 2020-11-10 PROCEDURE — 36415 COLL VENOUS BLD VENIPUNCTURE: CPT

## 2020-11-10 PROCEDURE — 71046 X-RAY EXAM CHEST 2 VIEWS: CPT | Performed by: INTERNAL MEDICINE

## 2020-11-10 PROCEDURE — 80048 BASIC METABOLIC PNL TOTAL CA: CPT

## 2020-11-10 PROCEDURE — 85027 COMPLETE CBC AUTOMATED: CPT

## 2020-11-10 NOTE — H&P
The above referenced H&P was reviewed by Laurena Heimlich, MD on 11/12/2020, the patient was examined and no significant changes have occurred in the patient's condition since the H&P was performed.   Risks and benefits were discussed, proceed with procedure a

## 2020-11-11 ENCOUNTER — CLINICAL ABSTRACT (OUTPATIENT)
Dept: CARDIOLOGY | Age: 35
End: 2020-11-11

## 2020-11-11 ENCOUNTER — TELEPHONE (OUTPATIENT)
Dept: CARDIOLOGY | Age: 35
End: 2020-11-11

## 2020-11-11 LAB
BUN SERPL-MCNC: 15 MG/DL
CALCIUM SERPL-MCNC: 9.4 MG/DL
CHLORIDE SERPL-SCNC: 100 MMOL/L
CREAT SERPL-MCNC: 1 MG/DL
GLUCOSE SERPL-MCNC: 279 MG/DL
HCT VFR BLD CALC: 45.6 %
HGB BLD-MCNC: 14.9 G/DL
PLATELET # BLD: 282 K/MCL
POTASSIUM SERPL-SCNC: 3.6 MMOL/L
RBC # BLD: 5.28 10*6/UL
SODIUM SERPL-SCNC: 136 MMOL/L
WBC # BLD: 6.2 K/MCL

## 2020-11-11 RX ORDER — HYDRALAZINE HYDROCHLORIDE 100 MG/1
100 TABLET, FILM COATED ORAL EVERY 8 HOURS
COMMUNITY

## 2020-11-12 ENCOUNTER — HOSPITAL ENCOUNTER (OUTPATIENT)
Dept: INTERVENTIONAL RADIOLOGY/VASCULAR | Facility: HOSPITAL | Age: 35
Discharge: HOME | End: 2020-11-12
Attending: INTERNAL MEDICINE | Admitting: INTERNAL MEDICINE
Payer: MEDICARE

## 2020-11-12 VITALS
BODY MASS INDEX: 35 KG/M2 | SYSTOLIC BLOOD PRESSURE: 151 MMHG | HEART RATE: 76 BPM | DIASTOLIC BLOOD PRESSURE: 94 MMHG | OXYGEN SATURATION: 97 % | WEIGHT: 276 LBS | RESPIRATION RATE: 12 BRPM

## 2020-11-12 DIAGNOSIS — Z01.818 PREOP TESTING: Primary | ICD-10-CM

## 2020-11-12 DIAGNOSIS — R07.89 CHEST DISCOMFORT: ICD-10-CM

## 2020-11-12 DIAGNOSIS — R06.02 SOB (SHORTNESS OF BREATH): ICD-10-CM

## 2020-11-12 PROCEDURE — 99152 MOD SED SAME PHYS/QHP 5/>YRS: CPT | Performed by: INTERNAL MEDICINE

## 2020-11-12 PROCEDURE — 99152 MOD SED SAME PHYS/QHP 5/>YRS: CPT

## 2020-11-12 PROCEDURE — 99153 MOD SED SAME PHYS/QHP EA: CPT

## 2020-11-12 PROCEDURE — 93458 L HRT ARTERY/VENTRICLE ANGIO: CPT

## 2020-11-12 PROCEDURE — 36415 COLL VENOUS BLD VENIPUNCTURE: CPT

## 2020-11-12 PROCEDURE — 4A023N7 MEASUREMENT OF CARDIAC SAMPLING AND PRESSURE, LEFT HEART, PERCUTANEOUS APPROACH: ICD-10-PCS | Performed by: INTERNAL MEDICINE

## 2020-11-12 PROCEDURE — B2151ZZ FLUOROSCOPY OF LEFT HEART USING LOW OSMOLAR CONTRAST: ICD-10-PCS | Performed by: INTERNAL MEDICINE

## 2020-11-12 PROCEDURE — B2111ZZ FLUOROSCOPY OF MULTIPLE CORONARY ARTERIES USING LOW OSMOLAR CONTRAST: ICD-10-PCS | Performed by: INTERNAL MEDICINE

## 2020-11-12 PROCEDURE — 93458 L HRT ARTERY/VENTRICLE ANGIO: CPT | Performed by: INTERNAL MEDICINE

## 2020-11-12 RX ORDER — MIDAZOLAM HYDROCHLORIDE 1 MG/ML
INJECTION INTRAMUSCULAR; INTRAVENOUS
Status: DISCONTINUED
Start: 2020-11-12 | End: 2020-11-12 | Stop reason: WASHOUT

## 2020-11-12 RX ORDER — HEPARIN SODIUM 1000 [USP'U]/ML
INJECTION, SOLUTION INTRAVENOUS; SUBCUTANEOUS
Status: COMPLETED
Start: 2020-11-12 | End: 2020-11-12

## 2020-11-12 RX ORDER — LIDOCAINE HYDROCHLORIDE 20 MG/ML
INJECTION, SOLUTION EPIDURAL; INFILTRATION; INTRACAUDAL; PERINEURAL
Status: COMPLETED
Start: 2020-11-12 | End: 2020-11-12

## 2020-11-12 RX ORDER — SODIUM CHLORIDE 9 MG/ML
INJECTION, SOLUTION INTRAVENOUS
Status: DISCONTINUED | OUTPATIENT
Start: 2020-11-12 | End: 2020-11-12

## 2020-11-12 RX ORDER — MIDAZOLAM HYDROCHLORIDE 1 MG/ML
INJECTION INTRAMUSCULAR; INTRAVENOUS
Status: COMPLETED
Start: 2020-11-12 | End: 2020-11-12

## 2020-11-12 RX ORDER — NITROGLYCERIN 20 MG/100ML
INJECTION INTRAVENOUS
Status: COMPLETED
Start: 2020-11-12 | End: 2020-11-12

## 2020-11-12 RX ORDER — VERAPAMIL HYDROCHLORIDE 2.5 MG/ML
INJECTION, SOLUTION INTRAVENOUS
Status: COMPLETED
Start: 2020-11-12 | End: 2020-11-12

## 2020-11-12 NOTE — PROCEDURES
Cardiologist: Leopoldo Smothers, MD  Primary Proceduralist: Leopoldo Smothers, MD  Procedure Performed: [facility] and [facility]  Date of Procedure: 11/12/2020   Indication: Stage intervention of circumflex    Summary of findings: Patent stent in the LAD, severe diffuse diseas moderate conscious sedation of versed and fentanyl was given. Patient was assessed and monitoring of oxygen, heart rate and blood pressure by nurse and myself during the exam 25 minutes.       Mandy Lakhani MD  11/12/20

## 2020-11-20 ENCOUNTER — OFFICE VISIT (OUTPATIENT)
Dept: CARDIOLOGY | Age: 35
End: 2020-11-20

## 2020-11-20 VITALS
HEART RATE: 110 BPM | SYSTOLIC BLOOD PRESSURE: 136 MMHG | HEIGHT: 74 IN | WEIGHT: 315 LBS | DIASTOLIC BLOOD PRESSURE: 86 MMHG | BODY MASS INDEX: 40.43 KG/M2

## 2020-11-20 DIAGNOSIS — I25.118 CORONARY ARTERY DISEASE OF NATIVE ARTERY OF NATIVE HEART WITH STABLE ANGINA PECTORIS (CMD): Primary | ICD-10-CM

## 2020-11-20 DIAGNOSIS — I10 ESSENTIAL HYPERTENSION: ICD-10-CM

## 2020-11-20 DIAGNOSIS — Z95.5 PRESENCE OF CORONARY ANGIOPLASTY IMPLANT AND GRAFT: ICD-10-CM

## 2020-11-20 DIAGNOSIS — E78.00 PURE HYPERCHOLESTEROLEMIA: ICD-10-CM

## 2020-11-20 PROCEDURE — 99214 OFFICE O/P EST MOD 30 MIN: CPT | Performed by: NURSE PRACTITIONER

## 2020-11-20 RX ORDER — METOPROLOL TARTRATE 100 MG/1
150 TABLET ORAL 2 TIMES DAILY
Qty: 135 TABLET | Refills: 3 | Status: SHIPPED | OUTPATIENT
Start: 2020-11-20

## 2020-11-20 RX ORDER — ASPIRIN 81 MG/1
81 TABLET ORAL DAILY
Qty: 90 TABLET | Refills: 3 | Status: SHIPPED | OUTPATIENT
Start: 2020-11-20

## 2020-11-20 ASSESSMENT — PATIENT HEALTH QUESTIONNAIRE - PHQ9
2. FEELING DOWN, DEPRESSED OR HOPELESS: NOT AT ALL
1. LITTLE INTEREST OR PLEASURE IN DOING THINGS: NOT AT ALL
SUM OF ALL RESPONSES TO PHQ9 QUESTIONS 1 AND 2: 0
CLINICAL INTERPRETATION OF PHQ9 SCORE: NO FURTHER SCREENING NEEDED
SUM OF ALL RESPONSES TO PHQ9 QUESTIONS 1 AND 2: 0
CLINICAL INTERPRETATION OF PHQ2 SCORE: NO FURTHER SCREENING NEEDED

## 2020-12-07 RX ORDER — HYDRALAZINE HYDROCHLORIDE 100 MG/1
100 TABLET, FILM COATED ORAL EVERY 8 HOURS
Qty: 270 TABLET | Refills: 0 | Status: SHIPPED | OUTPATIENT
Start: 2020-12-07 | End: 2021-03-11

## 2021-03-11 RX ORDER — HYDRALAZINE HYDROCHLORIDE 100 MG/1
100 TABLET, FILM COATED ORAL EVERY 8 HOURS
Qty: 90 TABLET | Refills: 0 | Status: SHIPPED | OUTPATIENT
Start: 2021-03-11 | End: 2021-04-06

## 2021-03-11 RX ORDER — HYDRALAZINE HYDROCHLORIDE 100 MG/1
100 TABLET, FILM COATED ORAL EVERY 8 HOURS
Qty: 270 TABLET | OUTPATIENT
Start: 2021-03-11

## 2021-04-06 RX ORDER — HYDRALAZINE HYDROCHLORIDE 100 MG/1
100 TABLET, FILM COATED ORAL EVERY 8 HOURS
Qty: 90 TABLET | Refills: 0 | Status: SHIPPED | OUTPATIENT
Start: 2021-04-06 | End: 2021-05-04

## 2021-05-04 RX ORDER — HYDRALAZINE HYDROCHLORIDE 100 MG/1
100 TABLET, FILM COATED ORAL EVERY 8 HOURS
Qty: 90 TABLET | Refills: 0 | Status: SHIPPED | OUTPATIENT
Start: 2021-05-04

## (undated) DEVICE — ZIMMER® STERILE DISPOSABLE TOURNIQUET CUFF WITH PLC, DUAL PORT, SINGLE BLADDER, 42 IN. (107 CM)

## (undated) DEVICE — GAMMEX® PI HYBRID SIZE 9, STERILE POWDER-FREE SURGICAL GLOVE, POLYISOPRENE AND NEOPRENE BLEND: Brand: GAMMEX

## (undated) DEVICE — DRESSING BIOPATCH 1X4 CNTR

## (undated) DEVICE — GAUZE SPONGES,12 PLY: Brand: CURITY

## (undated) DEVICE — DRAPE SHEET LG

## (undated) DEVICE — SOL  .9 1000ML BTL

## (undated) DEVICE — PROXIMATE SKIN STAPLERS (35 WIDE) CONTAINS 35 STAINLESS STEEL STAPLES (FIXED HEAD): Brand: PROXIMATE

## (undated) DEVICE — PADDING 4YDX6IN CTTN STRL WBRL

## (undated) DEVICE — COTTON UNDERCAST PADDING,REGULAR FINISH: Brand: WEBRIL

## (undated) DEVICE — TETRA-FLEX CF WOVEN LATEX FREE ELASTIC BANDAGE 4" X 5.5 YD: Brand: TETRA-FLEX™CF

## (undated) DEVICE — SUCTION CANISTER, 3000CC,SAFELINER: Brand: DEROYAL

## (undated) DEVICE — DRILL BIT SYNT 2.7X125 315.28

## (undated) DEVICE — C-ARMOR C-ARM EQUIPMENT COVERS CLEAR STERILE UNIVERSAL FIT 12 PER CASE: Brand: C-ARMOR

## (undated) DEVICE — INTENDED FOR TISSUE SEPARATION, AND OTHER PROCEDURES THAT REQUIRE A SHARP SURGICAL BLADE TO PUNCTURE OR CUT.: Brand: BARD-PARKER ® STAINLESS STEEL BLADES

## (undated) DEVICE — SPLINT PRECUT SYNTH 5X30

## (undated) DEVICE — 3M™ COBAN™ NL STERILE NON-LATEX SELF-ADHERENT WRAP, 2084S, 4 IN X 5 YD (10 CM X 4,5 M), 18 ROLLS/CASE: Brand: 3M™ COBAN™

## (undated) DEVICE — GLV RAD SENSICARE BLACK 8.5

## (undated) DEVICE — TRAY SKIN PREP PVP-1

## (undated) DEVICE — GAMMEX® NON-LATEX PI ORTHO SIZE 9, STERILE POLYISOPRENE POWDER-FREE SURGICAL GLOVE: Brand: GAMMEX

## (undated) DEVICE — TOWEL OR BLU 16X26 STRL

## (undated) DEVICE — DRILL BIT SYNT 2.5X110 310.25

## (undated) DEVICE — OCCLUSIVE GAUZE STRIP,3% BISMUTH TRIBROMOPHENATE IN PETROLATUM BLEND: Brand: XEROFORM

## (undated) DEVICE — BANDAGE FLXMSTR 11YDX6IN STRL

## (undated) DEVICE — KIT DRN 1/8IN PVC 3 SPRG EVAC

## (undated) DEVICE — BIT DRL 140/115MM 2MM 3 FLT 2

## (undated) DEVICE — 3M™ IOBAN™ 2 ANTIMICROBIAL INCISE DRAPE 6650EZ: Brand: IOBAN™ 2

## (undated) DEVICE — DRAPE C-ARM UNIVERSAL

## (undated) DEVICE — IMPERVIOUS STOCKINETTE: Brand: DEROYAL

## (undated) DEVICE — SUTURE MONOCRYL 3-0 Y497G

## (undated) DEVICE — BATTERY

## (undated) DEVICE — 3M™ STERI-STRIP™ COMPOUND BENZOIN TINCTURE 40 BAGS/CARTON 4 CARTONS/CASE C1544: Brand: 3M™ STERI-STRIP™

## (undated) DEVICE — 3M™ STERI-STRIP™ REINFORCED ADHESIVE SKIN CLOSURES, R1547, 1/2 IN X 4 IN (12 MM X 100 MM), 6 STRIPS/ENVELOPE: Brand: 3M™ STERI-STRIP™

## (undated) DEVICE — IMPLANTABLE DEVICE
Type: IMPLANTABLE DEVICE | Site: ANKLE | Status: NON-FUNCTIONAL
Removed: 2020-06-03

## (undated) DEVICE — SPLINT PLASTER 5

## (undated) DEVICE — ESMARK: Brand: DEROYAL

## (undated) DEVICE — LOWER EXTREMITY: Brand: MEDLINE INDUSTRIES, INC.

## (undated) DEVICE — SUTURE VICRYL 0 CP-1

## (undated) DEVICE — SOLUTION SURG DURA PREP HAZMAT

## (undated) DEVICE — MATTRESS PT HOVERMATT 1/2L 34W

## (undated) DEVICE — GOWN SURG AERO BLUE PERF LG

## (undated) NOTE — LETTER
[facility], [place]  Authorization for Invasive Procedures  1.  I hereby authorize  Dr. Viki Apley , my physician and whomever may be designated as the doctor's assistant, to perform the following operation and/or procedure: Cardiac C 4. Should the need arise during my operation or immediate post-operative period; I also consent to the administration of blood and/or blood products.  Further, I understand that despite careful testing and screening of blood and blood products, I may still 9. Patients having a sterilization procedure: I understand that if the procedure is successful the results will be permanent and it will therefore be impossible for me to inseminate, conceive or bear children.  I also understand that the procedure is intend

## (undated) NOTE — LETTER
[address], [place]  Authorization for Invasive Procedures  1.  I hereby authorize Venkata Skinner, my physician and whomever may be designated as the doctor's assistant, to perform the following operation and/or procedure:Cardiac cathete the event that I wish to have autologous transfusions of my own blood, or a directed donor transfusion, I will discuss this with my physician.      5. I consent to the photographing of the operations or procedures to be performed for the purposes of advanci Responsible person in case of minor or unconscious: _____________________________Relationship: ____________     Witness Signature: ____________________________________________ Date: __________ Time: ___________    Statement of Physician  My signature below

## (undated) NOTE — LETTER
[address], [place]  Authorization for Invasive Procedures  1.  I hereby authorize Dr. Curly Farnsworth , my physician and whomever may be designated as the doctor's assistant, to perform the following operation and/or procedure: Cardiac cath the event that I wish to have autologous transfusions of my own blood, or a directed donor transfusion, I will discuss this with my physician.      5. I consent to the photographing of the operations or procedures to be performed for the purposes of advanci Responsible person in case of minor or unconscious: _____________________________Relationship: ____________     Witness Signature: ____________________________________________ Date: __________ Time: ___________    Statement of Physician  My signature below